# Patient Record
Sex: FEMALE | Race: BLACK OR AFRICAN AMERICAN | NOT HISPANIC OR LATINO | Employment: OTHER | URBAN - METROPOLITAN AREA
[De-identification: names, ages, dates, MRNs, and addresses within clinical notes are randomized per-mention and may not be internally consistent; named-entity substitution may affect disease eponyms.]

---

## 2024-03-31 ENCOUNTER — APPOINTMENT (OUTPATIENT)
Dept: GENERAL RADIOLOGY | Facility: HOSPITAL | Age: 74
End: 2024-03-31
Payer: MEDICARE

## 2024-03-31 ENCOUNTER — APPOINTMENT (OUTPATIENT)
Dept: CARDIOLOGY | Facility: HOSPITAL | Age: 74
End: 2024-03-31
Payer: MEDICARE

## 2024-03-31 ENCOUNTER — HOSPITAL ENCOUNTER (OUTPATIENT)
Facility: HOSPITAL | Age: 74
Setting detail: OBSERVATION
Discharge: HOME | End: 2024-04-01
Attending: EMERGENCY MEDICINE | Admitting: EMERGENCY MEDICINE
Payer: MEDICARE

## 2024-03-31 DIAGNOSIS — U07.1 COVID-19: Primary | ICD-10-CM

## 2024-03-31 DIAGNOSIS — R06.00 DYSPNEA, UNSPECIFIED TYPE: ICD-10-CM

## 2024-03-31 DIAGNOSIS — R07.9 CHEST PAIN, UNSPECIFIED TYPE: ICD-10-CM

## 2024-03-31 LAB
ALBUMIN SERPL-MCNC: 3.9 G/DL (ref 3.5–5.2)
ALBUMIN/GLOB SERPL: 1.1 G/DL
ALP SERPL-CCNC: 84 U/L (ref 39–117)
ALT SERPL W P-5'-P-CCNC: 13 U/L (ref 1–33)
ANION GAP SERPL CALCULATED.3IONS-SCNC: 10 MMOL/L (ref 5–15)
APTT PPP: 29.8 SECONDS (ref 61–76.5)
AST SERPL-CCNC: 23 U/L (ref 1–32)
BACTERIA UR QL AUTO: NORMAL /HPF
BASOPHILS # BLD AUTO: 0.02 10*3/MM3 (ref 0–0.2)
BASOPHILS NFR BLD AUTO: 0.5 % (ref 0–1.5)
BH CV LOWER VASCULAR LEFT COMMON FEMORAL AUGMENT: NORMAL
BH CV LOWER VASCULAR LEFT COMMON FEMORAL COMPETENT: NORMAL
BH CV LOWER VASCULAR LEFT COMMON FEMORAL COMPRESS: NORMAL
BH CV LOWER VASCULAR LEFT COMMON FEMORAL PHASIC: NORMAL
BH CV LOWER VASCULAR LEFT COMMON FEMORAL SPONT: NORMAL
BH CV LOWER VASCULAR LEFT DISTAL FEMORAL COMPRESS: NORMAL
BH CV LOWER VASCULAR LEFT GASTRONEMIUS COMPRESS: NORMAL
BH CV LOWER VASCULAR LEFT GREATER SAPH BK COMPRESS: NORMAL
BH CV LOWER VASCULAR LEFT LESSER SAPH COMPRESS: NORMAL
BH CV LOWER VASCULAR LEFT MID FEMORAL AUGMENT: NORMAL
BH CV LOWER VASCULAR LEFT MID FEMORAL COMPETENT: NORMAL
BH CV LOWER VASCULAR LEFT MID FEMORAL COMPRESS: NORMAL
BH CV LOWER VASCULAR LEFT MID FEMORAL PHASIC: NORMAL
BH CV LOWER VASCULAR LEFT MID FEMORAL SPONT: NORMAL
BH CV LOWER VASCULAR LEFT PERONEAL COMPRESS: NORMAL
BH CV LOWER VASCULAR LEFT POPLITEAL AUGMENT: NORMAL
BH CV LOWER VASCULAR LEFT POPLITEAL COMPETENT: NORMAL
BH CV LOWER VASCULAR LEFT POPLITEAL COMPRESS: NORMAL
BH CV LOWER VASCULAR LEFT POPLITEAL PHASIC: NORMAL
BH CV LOWER VASCULAR LEFT POPLITEAL SPONT: NORMAL
BH CV LOWER VASCULAR LEFT POSTERIOR TIBIAL COMPRESS: NORMAL
BH CV LOWER VASCULAR LEFT PROXIMAL FEMORAL COMPRESS: NORMAL
BH CV LOWER VASCULAR LEFT SAPHENOFEMORAL JUNCTION COMPRESS: NORMAL
BH CV LOWER VASCULAR RIGHT COMMON FEMORAL AUGMENT: NORMAL
BH CV LOWER VASCULAR RIGHT COMMON FEMORAL COMPETENT: NORMAL
BH CV LOWER VASCULAR RIGHT COMMON FEMORAL COMPRESS: NORMAL
BH CV LOWER VASCULAR RIGHT COMMON FEMORAL PHASIC: NORMAL
BH CV LOWER VASCULAR RIGHT COMMON FEMORAL SPONT: NORMAL
BH CV LOWER VASCULAR RIGHT DISTAL FEMORAL COMPRESS: NORMAL
BH CV LOWER VASCULAR RIGHT GASTRONEMIUS COMPRESS: NORMAL
BH CV LOWER VASCULAR RIGHT GREATER SAPH AK COMPRESS: NORMAL
BH CV LOWER VASCULAR RIGHT GREATER SAPH BK COMPRESS: NORMAL
BH CV LOWER VASCULAR RIGHT LESSER SAPH COMPRESS: NORMAL
BH CV LOWER VASCULAR RIGHT MID FEMORAL AUGMENT: NORMAL
BH CV LOWER VASCULAR RIGHT MID FEMORAL COMPETENT: NORMAL
BH CV LOWER VASCULAR RIGHT MID FEMORAL COMPRESS: NORMAL
BH CV LOWER VASCULAR RIGHT MID FEMORAL PHASIC: NORMAL
BH CV LOWER VASCULAR RIGHT MID FEMORAL SPONT: NORMAL
BH CV LOWER VASCULAR RIGHT PERONEAL COMPRESS: NORMAL
BH CV LOWER VASCULAR RIGHT POPLITEAL AUGMENT: NORMAL
BH CV LOWER VASCULAR RIGHT POPLITEAL COMPETENT: NORMAL
BH CV LOWER VASCULAR RIGHT POPLITEAL COMPRESS: NORMAL
BH CV LOWER VASCULAR RIGHT POPLITEAL PHASIC: NORMAL
BH CV LOWER VASCULAR RIGHT POPLITEAL SPONT: NORMAL
BH CV LOWER VASCULAR RIGHT POSTERIOR TIBIAL COMPRESS: NORMAL
BH CV LOWER VASCULAR RIGHT PROXIMAL FEMORAL COMPRESS: NORMAL
BH CV LOWER VASCULAR RIGHT SAPHENOFEMORAL JUNCTION COMPRESS: NORMAL
BH CV VAS PRELIMINARY FINDINGS SCRIPTING: 1
BILIRUB SERPL-MCNC: 0.5 MG/DL (ref 0–1.2)
BILIRUB UR QL STRIP: NEGATIVE
BUN SERPL-MCNC: 16 MG/DL (ref 8–23)
BUN/CREAT SERPL: 19.8 (ref 7–25)
CALCIUM SPEC-SCNC: 10 MG/DL (ref 8.6–10.5)
CHLORIDE SERPL-SCNC: 104 MMOL/L (ref 98–107)
CHOLEST SERPL-MCNC: 161 MG/DL (ref 0–200)
CLARITY UR: CLEAR
CO2 SERPL-SCNC: 29 MMOL/L (ref 22–29)
COLOR UR: YELLOW
CREAT SERPL-MCNC: 0.81 MG/DL (ref 0.57–1)
D DIMER PPP FEU-MCNC: 0.46 MG/L (FEU) (ref 0–0.72)
DEPRECATED RDW RBC AUTO: 55.7 FL (ref 37–54)
EGFRCR SERPLBLD CKD-EPI 2021: 77.2 ML/MIN/1.73
EOSINOPHIL # BLD AUTO: 0.02 10*3/MM3 (ref 0–0.4)
EOSINOPHIL NFR BLD AUTO: 0.5 % (ref 0.3–6.2)
ERYTHROCYTE [DISTWIDTH] IN BLOOD BY AUTOMATED COUNT: 12.4 % (ref 12.3–15.4)
GLOBULIN UR ELPH-MCNC: 3.6 GM/DL
GLUCOSE SERPL-MCNC: 96 MG/DL (ref 65–99)
GLUCOSE UR STRIP-MCNC: NEGATIVE MG/DL
HBA1C MFR BLD: 4.9 % (ref 4.8–5.6)
HCT VFR BLD AUTO: 36.4 % (ref 34–46.6)
HDLC SERPL-MCNC: 59 MG/DL (ref 40–60)
HGB BLD-MCNC: 11.4 G/DL (ref 12–15.9)
HGB UR QL STRIP.AUTO: ABNORMAL
HYALINE CASTS UR QL AUTO: NORMAL /LPF
IMM GRANULOCYTES # BLD AUTO: 0.02 10*3/MM3 (ref 0–0.05)
IMM GRANULOCYTES NFR BLD AUTO: 0.5 % (ref 0–0.5)
INR PPP: 1 (ref 0.93–1.1)
KETONES UR QL STRIP: NEGATIVE
LDLC SERPL CALC-MCNC: 87 MG/DL (ref 0–100)
LDLC/HDLC SERPL: 1.47 {RATIO}
LEUKOCYTE ESTERASE UR QL STRIP.AUTO: ABNORMAL
LYMPHOCYTES # BLD AUTO: 0.84 10*3/MM3 (ref 0.7–3.1)
LYMPHOCYTES NFR BLD AUTO: 21.8 % (ref 19.6–45.3)
MACROCYTES BLD QL SMEAR: NORMAL
MCH RBC QN AUTO: 38.3 PG (ref 26.6–33)
MCHC RBC AUTO-ENTMCNC: 31.3 G/DL (ref 31.5–35.7)
MCV RBC AUTO: 122.1 FL (ref 79–97)
MONOCYTES # BLD AUTO: 0.3 10*3/MM3 (ref 0.1–0.9)
MONOCYTES NFR BLD AUTO: 7.8 % (ref 5–12)
NEUTROPHILS NFR BLD AUTO: 2.65 10*3/MM3 (ref 1.7–7)
NEUTROPHILS NFR BLD AUTO: 68.9 % (ref 42.7–76)
NITRITE UR QL STRIP: NEGATIVE
NRBC BLD AUTO-RTO: 0 /100 WBC (ref 0–0.2)
NT-PROBNP SERPL-MCNC: 224.7 PG/ML (ref 0–900)
PH UR STRIP.AUTO: 7 [PH] (ref 5–8)
PLATELET # BLD AUTO: 471 10*3/MM3 (ref 140–450)
PMV BLD AUTO: 8.8 FL (ref 6–12)
POTASSIUM SERPL-SCNC: 3.9 MMOL/L (ref 3.5–5.2)
PROT SERPL-MCNC: 7.5 G/DL (ref 6–8.5)
PROT UR QL STRIP: NEGATIVE
PROTHROMBIN TIME: 10.9 SECONDS (ref 9.6–11.7)
RBC # BLD AUTO: 2.98 10*6/MM3 (ref 3.77–5.28)
RBC # UR STRIP: NORMAL /HPF
REF LAB TEST METHOD: NORMAL
SMALL PLATELETS BLD QL SMEAR: NORMAL
SODIUM SERPL-SCNC: 143 MMOL/L (ref 136–145)
SP GR UR STRIP: 1.01 (ref 1–1.03)
SQUAMOUS #/AREA URNS HPF: NORMAL /HPF
T4 FREE SERPL-MCNC: 1.19 NG/DL (ref 0.93–1.7)
TRIGL SERPL-MCNC: 77 MG/DL (ref 0–150)
TROPONIN T SERPL HS-MCNC: 18 NG/L
TROPONIN T SERPL HS-MCNC: 19 NG/L
TSH SERPL DL<=0.05 MIU/L-ACNC: 0.95 UIU/ML (ref 0.27–4.2)
UROBILINOGEN UR QL STRIP: ABNORMAL
VLDLC SERPL-MCNC: 15 MG/DL (ref 5–40)
WBC # UR STRIP: NORMAL /HPF
WBC MORPH BLD: NORMAL
WBC NRBC COR # BLD AUTO: 3.85 10*3/MM3 (ref 3.4–10.8)

## 2024-03-31 PROCEDURE — G0378 HOSPITAL OBSERVATION PER HR: HCPCS

## 2024-03-31 PROCEDURE — 83036 HEMOGLOBIN GLYCOSYLATED A1C: CPT | Performed by: NURSE PRACTITIONER

## 2024-03-31 PROCEDURE — 84484 ASSAY OF TROPONIN QUANT: CPT | Performed by: NURSE PRACTITIONER

## 2024-03-31 PROCEDURE — 85610 PROTHROMBIN TIME: CPT | Performed by: EMERGENCY MEDICINE

## 2024-03-31 PROCEDURE — 84443 ASSAY THYROID STIM HORMONE: CPT | Performed by: NURSE PRACTITIONER

## 2024-03-31 PROCEDURE — 85730 THROMBOPLASTIN TIME PARTIAL: CPT | Performed by: EMERGENCY MEDICINE

## 2024-03-31 PROCEDURE — 93970 EXTREMITY STUDY: CPT | Performed by: STUDENT IN AN ORGANIZED HEALTH CARE EDUCATION/TRAINING PROGRAM

## 2024-03-31 PROCEDURE — 84484 ASSAY OF TROPONIN QUANT: CPT | Performed by: EMERGENCY MEDICINE

## 2024-03-31 PROCEDURE — 85025 COMPLETE CBC W/AUTO DIFF WBC: CPT | Performed by: EMERGENCY MEDICINE

## 2024-03-31 PROCEDURE — 93005 ELECTROCARDIOGRAM TRACING: CPT

## 2024-03-31 PROCEDURE — 83880 ASSAY OF NATRIURETIC PEPTIDE: CPT | Performed by: NURSE PRACTITIONER

## 2024-03-31 PROCEDURE — 71045 X-RAY EXAM CHEST 1 VIEW: CPT

## 2024-03-31 PROCEDURE — 80061 LIPID PANEL: CPT | Performed by: NURSE PRACTITIONER

## 2024-03-31 PROCEDURE — 93970 EXTREMITY STUDY: CPT

## 2024-03-31 PROCEDURE — 99285 EMERGENCY DEPT VISIT HI MDM: CPT

## 2024-03-31 PROCEDURE — 80053 COMPREHEN METABOLIC PANEL: CPT | Performed by: EMERGENCY MEDICINE

## 2024-03-31 PROCEDURE — 85007 BL SMEAR W/DIFF WBC COUNT: CPT | Performed by: EMERGENCY MEDICINE

## 2024-03-31 PROCEDURE — 81001 URINALYSIS AUTO W/SCOPE: CPT | Performed by: EMERGENCY MEDICINE

## 2024-03-31 PROCEDURE — 85379 FIBRIN DEGRADATION QUANT: CPT | Performed by: EMERGENCY MEDICINE

## 2024-03-31 PROCEDURE — 84439 ASSAY OF FREE THYROXINE: CPT | Performed by: NURSE PRACTITIONER

## 2024-03-31 RX ORDER — NITROGLYCERIN 0.4 MG/1
0.4 TABLET SUBLINGUAL
Status: DISCONTINUED | OUTPATIENT
Start: 2024-03-31 | End: 2024-04-01 | Stop reason: HOSPADM

## 2024-03-31 RX ORDER — SODIUM CHLORIDE 0.9 % (FLUSH) 0.9 %
10 SYRINGE (ML) INJECTION AS NEEDED
Status: DISCONTINUED | OUTPATIENT
Start: 2024-03-31 | End: 2024-04-01 | Stop reason: HOSPADM

## 2024-03-31 RX ORDER — BENZONATATE 100 MG/1
100 CAPSULE ORAL 3 TIMES DAILY PRN
Status: DISCONTINUED | OUTPATIENT
Start: 2024-03-31 | End: 2024-04-01 | Stop reason: HOSPADM

## 2024-03-31 RX ORDER — ALBUTEROL SULFATE 90 UG/1
2 AEROSOL, METERED RESPIRATORY (INHALATION) EVERY 6 HOURS PRN
Status: DISCONTINUED | OUTPATIENT
Start: 2024-03-31 | End: 2024-04-01 | Stop reason: HOSPADM

## 2024-03-31 RX ORDER — ONDANSETRON 4 MG/1
4 TABLET, ORALLY DISINTEGRATING ORAL EVERY 6 HOURS PRN
Status: DISCONTINUED | OUTPATIENT
Start: 2024-03-31 | End: 2024-04-01 | Stop reason: HOSPADM

## 2024-03-31 RX ORDER — ASPIRIN 81 MG/1
81 TABLET ORAL 2 TIMES DAILY
COMMUNITY

## 2024-03-31 RX ORDER — SODIUM CHLORIDE 0.9 % (FLUSH) 0.9 %
10 SYRINGE (ML) INJECTION EVERY 12 HOURS SCHEDULED
Status: DISCONTINUED | OUTPATIENT
Start: 2024-03-31 | End: 2024-04-01 | Stop reason: HOSPADM

## 2024-03-31 RX ORDER — HYDROXYUREA 500 MG/1
100 CAPSULE ORAL 2 TIMES DAILY
COMMUNITY

## 2024-03-31 RX ORDER — ACETAMINOPHEN 325 MG/1
650 TABLET ORAL EVERY 4 HOURS PRN
Status: DISCONTINUED | OUTPATIENT
Start: 2024-03-31 | End: 2024-04-01 | Stop reason: HOSPADM

## 2024-03-31 RX ORDER — DEXAMETHASONE SODIUM PHOSPHATE 4 MG/ML
6 INJECTION, SOLUTION INTRA-ARTICULAR; INTRALESIONAL; INTRAMUSCULAR; INTRAVENOUS; SOFT TISSUE DAILY
Qty: 20 ML | Refills: 0 | Status: DISCONTINUED | OUTPATIENT
Start: 2024-03-31 | End: 2024-04-01 | Stop reason: HOSPADM

## 2024-03-31 RX ORDER — BISACODYL 10 MG
10 SUPPOSITORY, RECTAL RECTAL DAILY PRN
Status: DISCONTINUED | OUTPATIENT
Start: 2024-03-31 | End: 2024-04-01 | Stop reason: HOSPADM

## 2024-03-31 RX ORDER — DEXAMETHASONE 6 MG/1
6 TABLET ORAL DAILY
Qty: 10 TABLET | Refills: 0 | Status: DISCONTINUED | OUTPATIENT
Start: 2024-03-31 | End: 2024-04-01 | Stop reason: HOSPADM

## 2024-03-31 RX ORDER — GUAIFENESIN 600 MG/1
600 TABLET, EXTENDED RELEASE ORAL EVERY 12 HOURS SCHEDULED
Status: DISCONTINUED | OUTPATIENT
Start: 2024-03-31 | End: 2024-04-01 | Stop reason: HOSPADM

## 2024-03-31 RX ORDER — BISACODYL 5 MG/1
5 TABLET, DELAYED RELEASE ORAL DAILY PRN
Status: DISCONTINUED | OUTPATIENT
Start: 2024-03-31 | End: 2024-04-01 | Stop reason: HOSPADM

## 2024-03-31 RX ORDER — HYDRALAZINE HYDROCHLORIDE 20 MG/ML
10 INJECTION INTRAMUSCULAR; INTRAVENOUS EVERY 6 HOURS PRN
Status: DISCONTINUED | OUTPATIENT
Start: 2024-03-31 | End: 2024-04-01 | Stop reason: HOSPADM

## 2024-03-31 RX ORDER — AMOXICILLIN 250 MG
2 CAPSULE ORAL 2 TIMES DAILY PRN
Status: DISCONTINUED | OUTPATIENT
Start: 2024-03-31 | End: 2024-04-01 | Stop reason: HOSPADM

## 2024-03-31 RX ORDER — POLYETHYLENE GLYCOL 3350 17 G/17G
17 POWDER, FOR SOLUTION ORAL DAILY PRN
Status: DISCONTINUED | OUTPATIENT
Start: 2024-03-31 | End: 2024-04-01 | Stop reason: HOSPADM

## 2024-03-31 RX ORDER — SODIUM CHLORIDE 9 MG/ML
40 INJECTION, SOLUTION INTRAVENOUS AS NEEDED
Status: DISCONTINUED | OUTPATIENT
Start: 2024-03-31 | End: 2024-04-01 | Stop reason: HOSPADM

## 2024-03-31 RX ORDER — ONDANSETRON 2 MG/ML
4 INJECTION INTRAMUSCULAR; INTRAVENOUS EVERY 6 HOURS PRN
Status: DISCONTINUED | OUTPATIENT
Start: 2024-03-31 | End: 2024-04-01 | Stop reason: HOSPADM

## 2024-03-31 RX ADMIN — SENNOSIDES AND DOCUSATE SODIUM 2 TABLET: 8.6; 5 TABLET ORAL at 21:59

## 2024-03-31 RX ADMIN — Medication 10 ML: at 21:55

## 2024-03-31 RX ADMIN — DEXAMETHASONE 6 MG: 6 TABLET ORAL at 18:22

## 2024-03-31 NOTE — PLAN OF CARE
Goal Outcome Evaluation:  Plan of Care Reviewed With: patient, daughter        Progress: improving  Outcome Evaluation: Patient alert and oriented. Patient on room air. Patient on tele. Patient has no c/o pain at this time. Patient has no c/o SOB at this time. Patient has ordered a meal and daughter is at bedside.

## 2024-03-31 NOTE — PLAN OF CARE
Problem: Adult Inpatient Plan of Care  Goal: Plan of Care Review  3/31/2024 1735 by Kaitlin Feliciano RN  Outcome: Ongoing, Progressing  3/31/2024 1734 by Kaitlin Feliciano RN  Outcome: Ongoing, Progressing  Flowsheets (Taken 3/31/2024 1734)  Progress: improving  Plan of Care Reviewed With:   patient   daughter  Outcome Evaluation: Patient alert and oriented. Patient on room air. Patient on tele. Patient has no c/o pain at this time. Patient has no c/o SOB at this time. Patient has ordered a meal and daughter is at bedside.  Goal: Patient-Specific Goal (Individualized)  3/31/2024 1735 by Kaitlin Feliciano RN  Outcome: Ongoing, Progressing  3/31/2024 1734 by Kaitlin Feliciano RN  Outcome: Ongoing, Progressing  Goal: Absence of Hospital-Acquired Illness or Injury  3/31/2024 1735 by Kaitlin Feliciano RN  Outcome: Ongoing, Progressing  3/31/2024 1734 by Kaitlin Feliciano RN  Outcome: Ongoing, Progressing  Goal: Optimal Comfort and Wellbeing  3/31/2024 1735 by Kaitlin Feliciano RN  Outcome: Ongoing, Progressing  3/31/2024 1734 by Kaitlin Feliciano RN  Outcome: Ongoing, Progressing  Goal: Readiness for Transition of Care  3/31/2024 1735 by Kaitlin Feliciano RN  Outcome: Ongoing, Progressing  3/31/2024 1734 by Kaitlin Feliciano RN  Outcome: Ongoing, Progressing  Intervention: Mutually Develop Transition Plan  Recent Flowsheet Documentation  Taken 3/31/2024 1726 by Kaitlin Feliciano RN  Equipment Currently Used at Home: none  Transportation Anticipated: family or friend will provide  Patient/Family Anticipated Services at Transition: none  Patient/Family Anticipates Transition to: home with family   Goal Outcome Evaluation:  Plan of Care Reviewed With: patient, daughter        Progress: improving  Outcome Evaluation: Patient alert and oriented. Patient on room air. Patient on tele. Patient has no c/o pain at this time. Patient has no c/o SOB at this time. Patient has ordered a meal and daughter is at bedside.

## 2024-03-31 NOTE — ED PROVIDER NOTES
"Subjective   History of Present Illness  Chief complaint: Patient is a 73-year-old who presents to the emergency department today from the urgent care.  She was diagnosed with COVID there.  The provider that took care of the patient was concerned about \"blood clots in my lungs\" and sent her to the emergency department.  She has had some shortness of breath with a cough for approximately 1 week.  She has not had fever.  She started with sinus congestion and was using saline spray that was not helping.  She noticed a \"discomfort in my chest\".  She also notes frequent urination for months.  She gave urine sample today and states they \"found blood in my urine\".  She has a chemo pill that she takes twice daily for blood cancer with elevated white count.  She cannot remember the name of the cancer.  She takes it twice a day.  She occasionally has some discomfort in her calfs bilaterally.  She does not have chest pain.    Context:    Duration:    Timing:    Severity:    Associated Symptoms:        PCP:  LMP:      Review of Systems   Constitutional:  Negative for fever.   HENT:  Positive for congestion.    Respiratory:  Positive for cough and shortness of breath.    Cardiovascular:  Negative for chest pain and leg swelling.   Gastrointestinal:  Negative for abdominal pain.   Genitourinary: Negative.    Musculoskeletal:  Positive for myalgias.       No past medical history on file.    No Known Allergies    No past surgical history on file.    No family history on file.    Social History     Socioeconomic History    Marital status:            Objective   Physical Exam  Vitals and nursing note reviewed.   Constitutional:       Appearance: She is well-developed.   HENT:      Head: Normocephalic and atraumatic.   Cardiovascular:      Rate and Rhythm: Normal rate and regular rhythm.   Pulmonary:      Effort: Pulmonary effort is normal.      Breath sounds: Normal breath sounds.   Musculoskeletal:      Right lower leg: No " tenderness. No edema.      Left lower leg: No tenderness. No edema.   Skin:     General: Skin is warm and dry.   Neurological:      General: No focal deficit present.      Mental Status: She is alert and oriented to person, place, and time.   Psychiatric:         Mood and Affect: Mood normal.         Behavior: Behavior normal.         Procedures           ED Course      Results for orders placed or performed during the hospital encounter of 03/31/24   Comprehensive Metabolic Panel    Specimen: Blood   Result Value Ref Range    Glucose 96 65 - 99 mg/dL    BUN 16 8 - 23 mg/dL    Creatinine 0.81 0.57 - 1.00 mg/dL    Sodium 143 136 - 145 mmol/L    Potassium 3.9 3.5 - 5.2 mmol/L    Chloride 104 98 - 107 mmol/L    CO2 29.0 22.0 - 29.0 mmol/L    Calcium 10.0 8.6 - 10.5 mg/dL    Total Protein 7.5 6.0 - 8.5 g/dL    Albumin 3.9 3.5 - 5.2 g/dL    ALT (SGPT) 13 1 - 33 U/L    AST (SGOT) 23 1 - 32 U/L    Alkaline Phosphatase 84 39 - 117 U/L    Total Bilirubin 0.5 0.0 - 1.2 mg/dL    Globulin 3.6 gm/dL    A/G Ratio 1.1 g/dL    BUN/Creatinine Ratio 19.8 7.0 - 25.0    Anion Gap 10.0 5.0 - 15.0 mmol/L    eGFR 77.2 >60.0 mL/min/1.73   Protime-INR    Specimen: Blood   Result Value Ref Range    Protime 10.9 9.6 - 11.7 Seconds    INR 1.00 0.93 - 1.10   aPTT    Specimen: Blood   Result Value Ref Range    PTT 29.8 (L) 61.0 - 76.5 seconds   D-dimer, Quantitative    Specimen: Blood   Result Value Ref Range    D-Dimer, Quantitative 0.46 0.00 - 0.72 mg/L (FEU)   High Sensitivity Troponin T    Specimen: Blood   Result Value Ref Range    HS Troponin T 19 (H) <14 ng/L   CBC Auto Differential    Specimen: Blood   Result Value Ref Range    WBC 3.85 3.40 - 10.80 10*3/mm3    RBC 2.98 (L) 3.77 - 5.28 10*6/mm3    Hemoglobin 11.4 (L) 12.0 - 15.9 g/dL    Hematocrit 36.4 34.0 - 46.6 %    .1 (H) 79.0 - 97.0 fL    MCH 38.3 (H) 26.6 - 33.0 pg    MCHC 31.3 (L) 31.5 - 35.7 g/dL    RDW 12.4 12.3 - 15.4 %    RDW-SD 55.7 (H) 37.0 - 54.0 fl    MPV 8.8 6.0 -  12.0 fL    Platelets 471 (H) 140 - 450 10*3/mm3    Neutrophil % 68.9 42.7 - 76.0 %    Lymphocyte % 21.8 19.6 - 45.3 %    Monocyte % 7.8 5.0 - 12.0 %    Eosinophil % 0.5 0.3 - 6.2 %    Basophil % 0.5 0.0 - 1.5 %    Immature Grans % 0.5 0.0 - 0.5 %    Neutrophils, Absolute 2.65 1.70 - 7.00 10*3/mm3    Lymphocytes, Absolute 0.84 0.70 - 3.10 10*3/mm3    Monocytes, Absolute 0.30 0.10 - 0.90 10*3/mm3    Eosinophils, Absolute 0.02 0.00 - 0.40 10*3/mm3    Basophils, Absolute 0.02 0.00 - 0.20 10*3/mm3    Immature Grans, Absolute 0.02 0.00 - 0.05 10*3/mm3    nRBC 0.0 0.0 - 0.2 /100 WBC   Urinalysis With Culture If Indicated - Urine, Clean Catch    Specimen: Urine, Clean Catch   Result Value Ref Range    Color, UA Yellow Yellow, Straw    Appearance, UA Clear Clear    pH, UA 7.0 5.0 - 8.0    Specific Gravity, UA 1.007 1.005 - 1.030    Glucose, UA Negative Negative    Ketones, UA Negative Negative    Bilirubin, UA Negative Negative    Blood, UA Trace (A) Negative    Protein, UA Negative Negative    Leuk Esterase, UA Small (1+) (A) Negative    Nitrite, UA Negative Negative    Urobilinogen, UA 0.2 E.U./dL 0.2 - 1.0 E.U./dL   Urinalysis, Microscopic Only - Urine, Clean Catch    Specimen: Urine, Clean Catch   Result Value Ref Range    RBC, UA 0-2 None Seen, 0-2 /HPF    WBC, UA 0-2 None Seen, 0-2 /HPF    Bacteria, UA None Seen None Seen /HPF    Squamous Epithelial Cells, UA 0-2 None Seen, 0-2 /HPF    Hyaline Casts, UA None Seen None Seen /LPF    Methodology Automated Microscopy    Scan Slide    Specimen: Blood   Result Value Ref Range    Macrocytes Slight/1+ None Seen    WBC Morphology Normal Normal    Platelet Estimate Increased Normal   ECG 12 Lead Dyspnea   Result Value Ref Range    QT Interval 393 ms    QTC Interval 437 ms   Duplex Venous Lower Extremity - BILATERAL   Result Value Ref Range    Right Common Femoral Spont Y     Right Common Femoral Competent Y     Right Common Femoral Phasic Y     Right Common Femoral Compress C      Right Common Femoral Augment Y     Right Saphenofemoral Junction Compress C     Right Proximal Femoral Compress C     Right Mid Femoral Spont Y     Right Mid Femoral Competent Y     Right Mid Femoral Phasic Y     Right Mid Femoral Compress C     Right Mid Femoral Augment Y     Right Distal Femoral Compress C     Right Popliteal Spont Y     Right Popliteal Competent Y     Right Popliteal Phasic Y     Right Popliteal Compress C     Right Popliteal Augment Y     Right Posterior Tibial Compress C     Right Peroneal Compress C     Right Gastronemius Compress C     Right Greater Saph AK Compress C     Right Greater Saph BK Compress C     Right Lesser Saph Compress C     Left Common Femoral Spont Y     Left Common Femoral Competent Y     Left Common Femoral Phasic Y     Left Common Femoral Compress C     Left Common Femoral Augment Y     Left Saphenofemoral Junction Compress C     Left Proximal Femoral Compress C     Left Mid Femoral Spont Y     Left Mid Femoral Competent Y     Left Mid Femoral Phasic Y     Left Mid Femoral Compress C     Left Mid Femoral Augment Y     Left Distal Femoral Compress C     Left Popliteal Spont Y     Left Popliteal Competent Y     Left Popliteal Phasic Y     Left Popliteal Compress C     Left Popliteal Augment Y     Left Posterior Tibial Compress C     Left Peroneal Compress C     Left Gastronemius Compress C     Left Greater Saph BK Compress C     Left Lesser Saph Compress C     BH CV VAS PRELIMINARY FINDINGS SCRIPTING 1.0                                 XR Chest 1 View    Result Date: 3/31/2024  Impression: No acute process identified. Electronically Signed: Ricardo Grewal MD  3/31/2024 1:44 PM EDT  Workstation ID: [id number]               Medical Decision Making  Patient was seen evaluated for the above problem    Differential diagnosis includes but is not limited to PE, COVID-19, ACS,    Patient had initial EKG interpreted by myself sinus rhythm rate of 74.  She has had bouts of an  uncomfortable sensation in her chest.  Initial troponin is 19 and mildly elevated.  Her D-dimer is normal.  She is not tachycardic or hypoxic.  I do not think she has PE.  Her ultrasound Dopplers are negative for DVT.  However with the elevated troponin and recurrent episode of dyspnea and chest discomfort will place in the ED observation unit for cardiac consultation.  She does have COVID.  Symptomatic for approximately 1 week at this point.  Discussed with Margie in the ED observation unit who agrees to take the patient.    Amount and/or Complexity of Data Reviewed  Labs: ordered. Decision-making details documented in ED Course.     Details: Labs reviewed by myself  Radiology: ordered and independent interpretation performed. Decision-making details documented in ED Course.     Details: X-ray reviewed by myself  ECG/medicine tests: ordered and independent interpretation performed.     Details: EKG interpreted by myself sinus rhythm rate of 74    Risk  Prescription drug management.  Decision regarding hospitalization.        Final diagnoses:   None   COVID-19  Chest pain/dyspnea    ED Disposition  ED Disposition       None            No follow-up provider specified.       Medication List      No changes were made to your prescriptions during this visit.            Gareth Babcock DO  03/31/24 1530

## 2024-03-31 NOTE — ED NOTES
Pt reports taking her son to Urgent Care yesterday- he was COVID positive. Pt went to [facility] today and tested positive for COVID as well. Pt reports some SOA and slight congestion feeling. Pt reports [facility] tested urine and it came back with blood in it. Pt reports increased frequency with urination. Pt denies GI symptoms. Pt reports on chemo pill BID.

## 2024-04-01 ENCOUNTER — READMISSION MANAGEMENT (OUTPATIENT)
Dept: CALL CENTER | Facility: HOSPITAL | Age: 74
End: 2024-04-01
Payer: MEDICARE

## 2024-04-01 VITALS
HEIGHT: 67 IN | TEMPERATURE: 97.6 F | WEIGHT: 233.69 LBS | HEART RATE: 98 BPM | OXYGEN SATURATION: 98 % | SYSTOLIC BLOOD PRESSURE: 164 MMHG | BODY MASS INDEX: 36.68 KG/M2 | RESPIRATION RATE: 15 BRPM | DIASTOLIC BLOOD PRESSURE: 77 MMHG

## 2024-04-01 LAB
ANION GAP SERPL CALCULATED.3IONS-SCNC: 3 MMOL/L (ref 5–15)
ANISOCYTOSIS BLD QL: NORMAL
BASOPHILS # BLD AUTO: 0.01 10*3/MM3 (ref 0–0.2)
BASOPHILS NFR BLD AUTO: 0.3 % (ref 0–1.5)
BUN SERPL-MCNC: 18 MG/DL (ref 8–23)
BUN/CREAT SERPL: 26.1 (ref 7–25)
CALCIUM SPEC-SCNC: 9.8 MG/DL (ref 8.6–10.5)
CHLORIDE SERPL-SCNC: 104 MMOL/L (ref 98–107)
CO2 SERPL-SCNC: 30 MMOL/L (ref 22–29)
CREAT SERPL-MCNC: 0.69 MG/DL (ref 0.57–1)
DEPRECATED RDW RBC AUTO: 53 FL (ref 37–54)
EGFRCR SERPLBLD CKD-EPI 2021: 92.3 ML/MIN/1.73
EOSINOPHIL # BLD AUTO: 0 10*3/MM3 (ref 0–0.4)
EOSINOPHIL NFR BLD AUTO: 0 % (ref 0.3–6.2)
ERYTHROCYTE [DISTWIDTH] IN BLOOD BY AUTOMATED COUNT: 11.9 % (ref 12.3–15.4)
GLUCOSE SERPL-MCNC: 136 MG/DL (ref 65–99)
HCT VFR BLD AUTO: 33.3 % (ref 34–46.6)
HGB BLD-MCNC: 10.5 G/DL (ref 12–15.9)
IMM GRANULOCYTES # BLD AUTO: 0.01 10*3/MM3 (ref 0–0.05)
IMM GRANULOCYTES NFR BLD AUTO: 0.3 % (ref 0–0.5)
IRON 24H UR-MRATE: 61 MCG/DL (ref 37–145)
IRON SATN MFR SERPL: 24 % (ref 20–50)
LYMPHOCYTES # BLD AUTO: 0.37 10*3/MM3 (ref 0.7–3.1)
LYMPHOCYTES NFR BLD AUTO: 10.5 % (ref 19.6–45.3)
MACROCYTES BLD QL SMEAR: NORMAL
MCH RBC QN AUTO: 38.3 PG (ref 26.6–33)
MCHC RBC AUTO-ENTMCNC: 31.5 G/DL (ref 31.5–35.7)
MCV RBC AUTO: 121.5 FL (ref 79–97)
MONOCYTES # BLD AUTO: 0.14 10*3/MM3 (ref 0.1–0.9)
MONOCYTES NFR BLD AUTO: 4 % (ref 5–12)
NEUTROPHILS NFR BLD AUTO: 2.98 10*3/MM3 (ref 1.7–7)
NEUTROPHILS NFR BLD AUTO: 84.9 % (ref 42.7–76)
NRBC BLD AUTO-RTO: 0 /100 WBC (ref 0–0.2)
PLAT MORPH BLD: NORMAL
PLATELET # BLD AUTO: 486 10*3/MM3 (ref 140–450)
PMV BLD AUTO: 9.4 FL (ref 6–12)
POTASSIUM SERPL-SCNC: 3.9 MMOL/L (ref 3.5–5.2)
QT INTERVAL: 393 MS
QTC INTERVAL: 437 MS
RBC # BLD AUTO: 2.74 10*6/MM3 (ref 3.77–5.28)
SODIUM SERPL-SCNC: 137 MMOL/L (ref 136–145)
TIBC SERPL-MCNC: 259 MCG/DL (ref 298–536)
TRANSFERRIN SERPL-MCNC: 174 MG/DL (ref 200–360)
WBC MORPH BLD: NORMAL
WBC NRBC COR # BLD AUTO: 3.51 10*3/MM3 (ref 3.4–10.8)

## 2024-04-01 PROCEDURE — 84466 ASSAY OF TRANSFERRIN: CPT | Performed by: NURSE PRACTITIONER

## 2024-04-01 PROCEDURE — G0378 HOSPITAL OBSERVATION PER HR: HCPCS

## 2024-04-01 PROCEDURE — 83540 ASSAY OF IRON: CPT | Performed by: NURSE PRACTITIONER

## 2024-04-01 PROCEDURE — 85007 BL SMEAR W/DIFF WBC COUNT: CPT | Performed by: NURSE PRACTITIONER

## 2024-04-01 PROCEDURE — 85025 COMPLETE CBC W/AUTO DIFF WBC: CPT | Performed by: NURSE PRACTITIONER

## 2024-04-01 PROCEDURE — 80048 BASIC METABOLIC PNL TOTAL CA: CPT | Performed by: NURSE PRACTITIONER

## 2024-04-01 RX ORDER — SODIUM CHLORIDE 9 MG/ML
100 INJECTION, SOLUTION INTRAVENOUS CONTINUOUS
Status: DISCONTINUED | OUTPATIENT
Start: 2024-04-01 | End: 2024-04-01 | Stop reason: HOSPADM

## 2024-04-01 RX ORDER — DEXAMETHASONE 6 MG/1
6 TABLET ORAL DAILY
Qty: 3 TABLET | Refills: 0 | Status: SHIPPED | OUTPATIENT
Start: 2024-04-02 | End: 2024-04-05

## 2024-04-01 RX ORDER — BENZONATATE 100 MG/1
100 CAPSULE ORAL 3 TIMES DAILY PRN
Qty: 30 CAPSULE | Refills: 0 | Status: SHIPPED | OUTPATIENT
Start: 2024-04-01

## 2024-04-01 RX ORDER — GUAIFENESIN 600 MG/1
600 TABLET, EXTENDED RELEASE ORAL EVERY 12 HOURS SCHEDULED
Qty: 14 TABLET | Refills: 0 | Status: SHIPPED | OUTPATIENT
Start: 2024-04-01 | End: 2024-04-08

## 2024-04-01 RX ORDER — ALBUTEROL SULFATE 90 UG/1
2 AEROSOL, METERED RESPIRATORY (INHALATION) EVERY 6 HOURS PRN
Qty: 18 G | Refills: 0 | Status: SHIPPED | OUTPATIENT
Start: 2024-04-01

## 2024-04-01 RX ADMIN — GUAIFENESIN 600 MG: 600 TABLET, EXTENDED RELEASE ORAL at 09:34

## 2024-04-01 RX ADMIN — DEXAMETHASONE 6 MG: 6 TABLET ORAL at 09:34

## 2024-04-01 RX ADMIN — Medication 10 ML: at 09:34

## 2024-04-01 NOTE — CASE MANAGEMENT/SOCIAL WORK
Discharge Planning Assessment   Fil[place]     Patient Name: Ileana Magdaleno  MRN: [id number]  Today's Date: 4/1/2024    Admit Date: 3/31/2024    Plan: ROYCE PLAN: Routine home     Discharge Needs Assessment       Row Name 04/01/24 0856       Living Environment    People in Home child(gabby), adult    Name(s) of People in Home Lives with son.    Current Living Arrangements home    Potentially Unsafe Housing Conditions none    In the past 12 months has the electric, gas, oil, or water company threatened to shut off services in your home? No    Primary Care Provided by self    Provides Primary Care For no one    Family Caregiver if Needed spouse    Quality of Family Relationships helpful;involved;supportive    Able to Return to Prior Arrangements yes       Resource/Environmental Concerns    Resource/Environmental Concerns none    Transportation Concerns none       Transportation Needs    In the past 12 months, has lack of transportation kept you from medical appointments or from getting medications? no    In the past 12 months, has lack of transportation kept you from meetings, work, or from getting things needed for daily living? No       Food Insecurity    Within the past 12 months, you worried that your food would run out before you got the money to buy more. Never true    Within the past 12 months, the food you bought just didn't last and you didn't have money to get more. Never true       Transition Planning    Patient/Family Anticipates Transition to home with family    Patient/Family Anticipated Services at Transition none    Transportation Anticipated car, drives self;family or friend will provide       Discharge Needs Assessment    Readmission Within the Last 30 Days no previous admission in last 30 days    Equipment Currently Used at Home none    Anticipated Changes Related to Illness none    Equipment Needed After Discharge none                   Discharge Plan       Row Name 04/01/24 0857       Plan    Plan ROYCE  PLAN: Routine home    Patient/Family in Agreement with Plan yes    Plan Comments Met with patient and family at bedside, from routine home with son. Independent with ADL's no DME. PCP was Dr. Desir, but she left. Patient wants to research and find a good female MD. Uses oncologist as PCP for now. Pharmacy is [facility]. Able to afford medications and denies any issues with food or utilities. Denies any transportation issues, family will provide. Denies any HHC or SNF needs, denies any concerns about return home.                      Continued Care and Services - Admitted Since 3/31/2024    No active coordination exists for this encounter.       Expected Discharge Date and Time       Expected Discharge Date Expected Discharge Time    Apr 2, 2024            Demographic Summary       Row Name 04/01/24 0856       General Information    Admission Type observation    Arrived From emergency department    Required Notices Provided Observation Status Notice    Referral Source admission list    Reason for Consult discharge planning    Preferred Language English       Contact Information    Permission Granted to Share Info With     Contact Information Obtained for                    Functional Status       Row Name 04/01/24 0856       Functional Status    Usual Activity Tolerance excellent    Current Activity Tolerance excellent       Physical Activity    On average, how many days per week do you engage in moderate to strenuous exercise (like a brisk walk)? 0 days    On average, how many minutes do you engage in exercise at this level? 0 min    Number of minutes of exercise per week 0       Assessment of Health Literacy    How often do you have someone help you read hospital materials? Sometimes    How often do you have problems learning about your medical condition because of difficulty understanding written information? Sometimes    How often do you have a problem understanding what is told to you  about your medical condition? Sometimes    How confident are you filling out medical forms by yourself? Somewhat    Health Literacy Moderate       Functional Status, IADL    Medications independent    Meal Preparation independent    Housekeeping independent    Laundry independent    Shopping independent       Mental Status    General Appearance WDL WDL       Mental Status Summary    Recent Changes in Mental Status/Cognitive Functioning no changes                Met with patient in room wearing PPE: mask, face shield/goggles, gloves, gown.      Maintained distance greater than six feet and spent less than 15 minutes in the room.  Ellen Wright RN    Case Management  [phone]

## 2024-04-01 NOTE — PLAN OF CARE
Problem: Adult Inpatient Plan of Care  Goal: Plan of Care Review  4/1/2024 0609 by Katia Oliver RN  Outcome: Ongoing, Progressing  4/1/2024 0609 by Katia Oliver RN  Outcome: Ongoing, Progressing  Goal: Patient-Specific Goal (Individualized)  4/1/2024 0609 by Katia Oliver RN  Outcome: Ongoing, Progressing  4/1/2024 0609 by Katia Oliver RN  Outcome: Ongoing, Progressing  Goal: Absence of Hospital-Acquired Illness or Injury  4/1/2024 0609 by Katia Oliver RN  Outcome: Ongoing, Progressing  4/1/2024 0609 by Katia Oliver RN  Outcome: Ongoing, Progressing  Goal: Optimal Comfort and Wellbeing  4/1/2024 0609 by Katia Oliver RN  Outcome: Ongoing, Progressing  4/1/2024 0609 by Katia Oliver RN  Outcome: Ongoing, Progressing  Goal: Readiness for Transition of Care  4/1/2024 0609 by Katia Oliver RN  Outcome: Ongoing, Progressing  4/1/2024 0609 by Katia Oliver RN  Outcome: Ongoing, Progressing     Problem: Breathing Pattern Ineffective  Goal: Effective Breathing Pattern  Outcome: Ongoing, Progressing   Goal Outcome Evaluation:

## 2024-04-01 NOTE — DISCHARGE SUMMARY
"[facility]    Provider, No Known    CHIEF COMPLAINT:     Cough, dyspnea, chest pain     HISTORY OF PRESENT ILLNESS:    HPI    Patient is a 73-year-old who presents to the emergency department today from the urgent care. She was diagnosed with COVID there. The provider that took care of the patient was concerned about \"blood clots in my lungs\" and sent her to the emergency department. She has had some shortness of breath with a cough for approximately 1 week. She has not had fever. She started with sinus congestion and was using saline spray that was not helping. She noticed a \"discomfort in my chest\". She also notes frequent urination for months. She gave urine sample today and states they \"found blood in my urine\". She has a chemo pill that she takes twice daily for blood cancer with elevated white count. She cannot remember the name of the cancer. She takes it twice a day. She occasionally has some discomfort in her calfs bilaterally. She does not have chest pain.     History reviewed. No pertinent past medical history.  History reviewed. No pertinent surgical history.  History reviewed. No pertinent family history.  Social History     Tobacco Use    Smoking status: Never    Smokeless tobacco: Never   Vaping Use    Vaping status: Never Used   Substance Use Topics    Alcohol use: Never    Drug use: Never     Medications Prior to Admission   Medication Sig Dispense Refill Last Dose    aspirin 81 MG EC tablet Take 1 tablet by mouth 2 (Two) Times a Day.   3/30/2024    hydroxyurea (HYDREA) 500 MG capsule Take 100 mg/kg by mouth 2 (Two) Times a Day.   3/30/2024     Allergies:  Patient has no known allergies.    Immunization History   Administered Date(s) Administered    COVID-19 (MODERNA) 1st,2nd,3rd Dose Monovalent 10/26/2021, 11/23/2021           REVIEW OF SYSTEMS:    Review of Systems   Constitutional: Positive for malaise/fatigue. Negative for fever.   HENT:  Positive for congestion.    Eyes: " Negative.    Cardiovascular:  Positive for dyspnea on exertion.   Respiratory:  Positive for cough and shortness of breath.    Endocrine: Negative.    Hematologic/Lymphatic: Negative.    Skin: Negative.    Musculoskeletal:  Positive for myalgias.   Gastrointestinal: Negative.    Genitourinary: Negative.    Neurological:  Positive for weakness.   Psychiatric/Behavioral: Negative.     Allergic/Immunologic: Negative.        Vital Signs  Temp:  [97.6 °F (36.4 °C)-98.5 °F (36.9 °C)] 97.6 °F (36.4 °C)  Heart Rate:  [63-98] 98  Resp:  [13-20] 15  BP: (120-199)/(57-91) 164/77          Physical Exam:  Physical Exam  Vitals and nursing note reviewed.   Constitutional:       Appearance: Normal appearance.   HENT:      Head: Normocephalic and atraumatic.      Right Ear: External ear normal.      Left Ear: External ear normal.      Nose: Congestion present.      Mouth/Throat:      Pharynx: Oropharynx is clear.   Eyes:      Extraocular Movements: Extraocular movements intact.      Conjunctiva/sclera: Conjunctivae normal.      Pupils: Pupils are equal, round, and reactive to light.   Cardiovascular:      Rate and Rhythm: Normal rate and regular rhythm.      Pulses: Normal pulses.      Heart sounds: Normal heart sounds.   Pulmonary:      Effort: Pulmonary effort is normal.      Breath sounds: Wheezing present.   Abdominal:      General: Bowel sounds are normal.      Palpations: Abdomen is soft.   Musculoskeletal:         General: Normal range of motion.      Cervical back: Normal range of motion.   Skin:     General: Skin is warm.      Capillary Refill: Capillary refill takes less than 2 seconds.   Neurological:      Mental Status: She is alert and oriented to person, place, and time.   Psychiatric:         Mood and Affect: Mood normal.         Behavior: Behavior normal.         Thought Content: Thought content normal.         Judgment: Judgment normal.         Emotional Behavior:    WNL   Debilities:   none  Results Review:    I  reviewed the patient's new clinical results.  Lab Results (most recent)       Procedure Component Value Units Date/Time    Iron Profile [[id number]]  (Abnormal) Collected: 04/01/24 0607    Specimen: Blood Updated: 04/01/24 0733     Iron 61 mcg/dL      Iron Saturation (TSAT) 24 %      Transferrin 174 mg/dL      TIBC 259 mcg/dL     CBC & Differential [[id number]]  (Abnormal) Collected: 04/01/24 0607    Specimen: Blood Updated: 04/01/24 0658    Narrative:      The following orders were created for panel order CBC & Differential.  Procedure                               Abnormality         Status                     ---------                               -----------         ------                     CBC Auto Differential[[id number]]        Abnormal            Final result               Scan Slide[[id number]]                                       Final result                 Please view results for these tests on the individual orders.    CBC Auto Differential [[id number]]  (Abnormal) Collected: 04/01/24 0607    Specimen: Blood Updated: 04/01/24 0658     WBC 3.51 10*3/mm3      RBC 2.74 10*6/mm3      Hemoglobin 10.5 g/dL      Hematocrit 33.3 %      .5 fL      MCH 38.3 pg      MCHC 31.5 g/dL      RDW 11.9 %      RDW-SD 53.0 fl      MPV 9.4 fL      Platelets 486 10*3/mm3      Neutrophil % 84.9 %      Lymphocyte % 10.5 %      Monocyte % 4.0 %      Eosinophil % 0.0 %      Basophil % 0.3 %      Immature Grans % 0.3 %      Neutrophils, Absolute 2.98 10*3/mm3      Lymphocytes, Absolute 0.37 10*3/mm3      Monocytes, Absolute 0.14 10*3/mm3      Eosinophils, Absolute 0.00 10*3/mm3      Basophils, Absolute 0.01 10*3/mm3      Immature Grans, Absolute 0.01 10*3/mm3      nRBC 0.0 /100 WBC     Narrative:      Appended report. These results have been appended to a previously verified report.    Scan Slide [[id number]] Collected: 04/01/24 0607    Specimen: Blood Updated: 04/01/24 0658     Anisocytosis Slight/1+     Macrocytes Slight/1+      WBC Morphology Normal     Platelet Morphology Normal    Basic Metabolic Panel [[id number]]  (Abnormal) Collected: 04/01/24 0607    Specimen: Blood Updated: 04/01/24 0658     Glucose 136 mg/dL      BUN 18 mg/dL      Creatinine 0.69 mg/dL      Sodium 137 mmol/L      Potassium 3.9 mmol/L      Chloride 104 mmol/L      CO2 30.0 mmol/L      Calcium 9.8 mg/dL      BUN/Creatinine Ratio 26.1     Anion Gap 3.0 mmol/L      eGFR 92.3 mL/min/1.73     Narrative:      GFR Normal >60  Chronic Kidney Disease <60  Kidney Failure <15    The GFR formula is only valid for adults with stable renal function between ages 18 and 70.    High Sensitivity Troponin T [[id number]]  (Abnormal) Collected: 03/31/24 1753    Specimen: Blood Updated: 03/31/24 1818     HS Troponin T 18 ng/L     Narrative:      High Sensitive Troponin T Reference Range:  <14.0 ng/L- Negative Female for AMI  <22.0 ng/L- Negative Male for AMI  >=14 - Abnormal Female indicating possible myocardial injury.  >=22 - Abnormal Male indicating possible myocardial injury.   Clinicians would have to utilize clinical acumen, EKG, Troponin, and serial changes to determine if it is an Acute Myocardial Infarction or myocardial injury due to an underlying chronic condition.         BNP [[id number]]  (Normal) Collected: 03/31/24 1422    Specimen: Blood Updated: 03/31/24 1726     proBNP 224.7 pg/mL     Narrative:      This assay is used as an aid in the diagnosis of individuals suspected of having heart failure. It can be used as an aid in the diagnosis of acute decompensated heart failure (ADHF) in patients presenting with signs and symptoms of ADHF to the emergency department (ED). In addition, NT-proBNP of <300 pg/mL indicates ADHF is not likely.    Age Range Result Interpretation  NT-proBNP Concentration (pg/mL:      <50             Positive            >450                   Gray                 300-450                    Negative             <300    50-75           Positive             >900                  Gray                300-900                  Negative            <300      >75             Positive            >1800                  Gray                300-1800                  Negative            <300    TSH [[id number]]  (Normal) Collected: 03/31/24 1422    Specimen: Blood Updated: 03/31/24 1726     TSH 0.952 uIU/mL     T4, Free [[id number]]  (Normal) Collected: 03/31/24 1422    Specimen: Blood Updated: 03/31/24 1726     Free T4 1.19 ng/dL     Narrative:      Results may be falsely increased if patient taking Biotin.      Lipid Panel [[id number]] Collected: 03/31/24 1422    Specimen: Blood Updated: 03/31/24 1721     Total Cholesterol 161 mg/dL      Triglycerides 77 mg/dL      HDL Cholesterol 59 mg/dL      LDL Cholesterol  87 mg/dL      VLDL Cholesterol 15 mg/dL      LDL/HDL Ratio 1.47    Narrative:      Cholesterol Reference Ranges  (U.S. Department of Health and Human Services ATP III Classifications)    Desirable          <200 mg/dL  Borderline High    200-239 mg/dL  High Risk          >240 mg/dL      Triglyceride Reference Ranges  (U.S. Department of Health and Human Services ATP III Classifications)    Normal           <150 mg/dL  Borderline High  150-199 mg/dL  High             200-499 mg/dL  Very High        >500 mg/dL    HDL Reference Ranges  (U.S. Department of Health and Human Services ATP III Classifications)    Low     <40 mg/dl (major risk factor for CHD)  High    >60 mg/dl ('negative' risk factor for CHD)        LDL Reference Ranges  (U.S. Department of Health and Human Services ATP III Classifications)    Optimal          <100 mg/dL  Near Optimal     100-129 mg/dL  Borderline High  130-159 mg/dL  High             160-189 mg/dL  Very High        >189 mg/dL    Hemoglobin A1c [[id number]]  (Normal) Collected: 03/31/24 1422    Specimen: Blood Updated: 03/31/24 1717     Hemoglobin A1C 4.90 %     Comprehensive Metabolic Panel [[id number]] Collected: 03/31/24 1422    Specimen: Blood  Updated: 03/31/24 1457     Glucose 96 mg/dL      BUN 16 mg/dL      Creatinine 0.81 mg/dL      Sodium 143 mmol/L      Potassium 3.9 mmol/L      Chloride 104 mmol/L      CO2 29.0 mmol/L      Calcium 10.0 mg/dL      Total Protein 7.5 g/dL      Albumin 3.9 g/dL      ALT (SGPT) 13 U/L      AST (SGOT) 23 U/L      Alkaline Phosphatase 84 U/L      Total Bilirubin 0.5 mg/dL      Globulin 3.6 gm/dL      A/G Ratio 1.1 g/dL      BUN/Creatinine Ratio 19.8     Anion Gap 10.0 mmol/L      eGFR 77.2 mL/min/1.73     Narrative:      GFR Normal >60  Chronic Kidney Disease <60  Kidney Failure <15    The GFR formula is only valid for adults with stable renal function between ages 18 and 70.    High Sensitivity Troponin T [[id number]]  (Abnormal) Collected: 03/31/24 1422    Specimen: Blood Updated: 03/31/24 1457     HS Troponin T 19 ng/L     Narrative:      High Sensitive Troponin T Reference Range:  <14.0 ng/L- Negative Female for AMI  <22.0 ng/L- Negative Male for AMI  >=14 - Abnormal Female indicating possible myocardial injury.  >=22 - Abnormal Male indicating possible myocardial injury.   Clinicians would have to utilize clinical acumen, EKG, Troponin, and serial changes to determine if it is an Acute Myocardial Infarction or myocardial injury due to an underlying chronic condition.         CBC & Differential [[id number]]  (Abnormal) Collected: 03/31/24 1422    Specimen: Blood Updated: 03/31/24 1455    Narrative:      The following orders were created for panel order CBC & Differential.  Procedure                               Abnormality         Status                     ---------                               -----------         ------                     CBC Auto Differential[[id number]]        Abnormal            Final result               Scan Slide[[id number]]                                       Final result                 Please view results for these tests on the individual orders.    CBC Auto Differential [[id number]]   (Abnormal) Collected: 03/31/24 1422    Specimen: Blood Updated: 03/31/24 1455     WBC 3.85 10*3/mm3      RBC 2.98 10*6/mm3      Hemoglobin 11.4 g/dL      Hematocrit 36.4 %      .1 fL      MCH 38.3 pg      MCHC 31.3 g/dL      RDW 12.4 %      RDW-SD 55.7 fl      MPV 8.8 fL      Platelets 471 10*3/mm3      Neutrophil % 68.9 %      Lymphocyte % 21.8 %      Monocyte % 7.8 %      Eosinophil % 0.5 %      Basophil % 0.5 %      Immature Grans % 0.5 %      Neutrophils, Absolute 2.65 10*3/mm3      Lymphocytes, Absolute 0.84 10*3/mm3      Monocytes, Absolute 0.30 10*3/mm3      Eosinophils, Absolute 0.02 10*3/mm3      Basophils, Absolute 0.02 10*3/mm3      Immature Grans, Absolute 0.02 10*3/mm3      nRBC 0.0 /100 WBC     Scan Slide [[id number]] Collected: 03/31/24 1422    Specimen: Blood Updated: 03/31/24 1455     Macrocytes Slight/1+     WBC Morphology Normal     Platelet Estimate Increased    Protime-INR [[id number]]  (Normal) Collected: 03/31/24 1422    Specimen: Blood Updated: 03/31/24 1441     Protime 10.9 Seconds      INR 1.00    aPTT [[id number]]  (Abnormal) Collected: 03/31/24 1422    Specimen: Blood Updated: 03/31/24 1441     PTT 29.8 seconds     D-dimer, Quantitative [[id number]]  (Normal) Collected: 03/31/24 1422    Specimen: Blood Updated: 03/31/24 1441     D-Dimer, Quantitative 0.46 mg/L (FEU)     Narrative:      According to the assay 's published package insert, a normal (<0.50 mg/L (FEU)) D-dimer result in conjunction with a non-high clinical probability assessment, excludes deep vein thrombosis (DVT) and pulmonary embolism (PE) with high sensitivity.    D-dimer values increase with age and this can make VTE exclusion of an older population difficult. To address this, the [employer], based on best available evidence and recent guidelines, recommends that clinicians use age-adjusted D-dimer thresholds in patients greater than 50 years of age with: a) a low probability of PE who  "do not meet all Pulmonary Embolism Rule Out Criteria, or b) in those with intermediate probability of PE.   The formula for an age-adjusted D-dimer cut-off is \"age/100\".  For example, a 60 year old patient would have an age-adjusted cut-off of 0.60 mg/L (FEU) and an 80 year old 0.80 mg/L (FEU).    Urinalysis, Microscopic Only - Urine, Clean Catch [[id number]] Collected: 03/31/24 1322    Specimen: Urine, Clean Catch Updated: 03/31/24 1331     RBC, UA 0-2 /HPF      WBC, UA 0-2 /HPF      Comment: Urine culture not indicated.        Bacteria, UA None Seen /HPF      Squamous Epithelial Cells, UA 0-2 /HPF      Hyaline Casts, UA None Seen /LPF      Methodology Automated Microscopy    Urinalysis With Culture If Indicated - Urine, Clean Catch [[id number]]  (Abnormal) Collected: 03/31/24 1322    Specimen: Urine, Clean Catch Updated: 03/31/24 1328     Color, UA Yellow     Appearance, UA Clear     pH, UA 7.0     Specific Gravity, UA 1.007     Glucose, UA Negative     Ketones, UA Negative     Bilirubin, UA Negative     Blood, UA Trace     Protein, UA Negative     Leuk Esterase, UA Small (1+)     Nitrite, UA Negative     Urobilinogen, UA 0.2 E.U./dL    Narrative:      In absence of clinical symptoms, the presence of pyuria, bacteria, and/or nitrites on the urinalysis result does not correlate with infection.            Imaging Results (Most Recent)       Procedure Component Value Units Date/Time    XR Chest 1 View [[id number]] Collected: 03/31/24 1343     Updated: 03/31/24 1430    Narrative:      XR CHEST 1 VW    Date of Exam: 3/31/2024 1:25 PM EDT    Indication: cough    Comparison: None available.    Findings:  Cardiomediastinal silhouette is unremarkable.  No airspace disease, pneumothorax, nor pleural effusion. No acute osseous abnormality identified.      Impression:      Impression:  No acute process identified.      Electronically Signed: Ricardo Grewal MD    3/31/2024 1:44 PM EDT    Workstation ID: [id number]      "     reviewed    ECG/EMG Results (most recent)       Procedure Component Value Units Date/Time    ECG 12 Lead Dyspnea [[id number]] Collected: 03/31/24 1231     Updated: 04/01/24 0603     QT Interval 393 ms      QTC Interval 437 ms     Narrative:      HEART RATE= 74  bpm  RR Interval= 808  ms  LA Interval= 186  ms  P Horizontal Axis= -8  deg  P Front Axis= 73  deg  QRSD Interval= 86  ms  QT Interval= 393  ms  QTcB= 437  ms  QRS Axis= 23  deg  T Wave Axis= 50  deg  - NORMAL ECG -  Sinus rhythm  No previous ECG available for comparison  Electronically Signed By: Gareth Babcock) 01-Apr-2024 06:03:35  Date and Time of Study: 2024-03-31 12:31:19          reviewed    Results for orders placed during the hospital encounter of 03/31/24    Duplex Venous Lower Extremity - BILATERAL    Interpretation Summary    No deep or superficial vein thrombosis identified.    Right greater saphenous vein above the knee not visualized.          Microbiology Results (last 10 days)       ** No results found for the last 240 hours. **            Assessment & Plan     Chest pain    Dyspnea    COVID-19     COVID-19  -Respiratory panel positive for UC yesterday  -Isolation precautions  -Continue Mucinex, tessalon, Decadron, breathing treatments  -IV/oral analgesics and antiemetics as needed  -Troponin 19 and 18  -BNP within normal limits  -Continue aspirin  -Chest x-ray showed no acute cardiopulmonary disease  -EKG shows sinus without acute wave changes  -Venous duplex negative for blood clots    Thrombocytosis  -Platelets 486,000  -Continue Hydrea  -Continue follow-up with Dr. Puga at [facility]    I discussed the patients findings and my recommendations with patient and family.     Discharge Diagnosis:      Chest pain    Dyspnea    COVID-19      Hospital Course  Patient is a 73 y.o. female presented with dyspnea and chest pain.  Patient states she was diagnosed with COVID-19 yesterday through urgent care.  Patient states she does have anxiety at  COVID due to losing has been over illness.  Patient reports shortness of breath with cough for about a week that is nonproductive without fever.  Patient does report congestion and some shortness of breath at rest and with exertion.  Patient reports no chest pain today.  Patient unable to have stress test or other extensive cardiac workup due to being Cocet positive.  Patient was given Mucinex, Tessalon, Decadron breathing treatments with improvement in pain.  Chest x-ray showed no acute cardiopulmonary disease.  EKG showed sinus rhythm without acute changes.  Venous duplex negative for blood clots.  Platelets at 486,000 which has improved from last blood test for patient's history of thrombosed cytosis.  Patient to take medication as prescribed.  Patient to follow-up with outpatient specialists and PCP at scheduled appointments.  Test and recommendations reviewed in length with patient and family and they agree to treatment plan.  If symptoms worsen patient to call 911 or go to nearest ED.    Past Medical History:   History reviewed. No pertinent past medical history.    Past Surgical History:   History reviewed. No pertinent surgical history.    Social History:   Social History     Socioeconomic History    Marital status:    Tobacco Use    Smoking status: Never    Smokeless tobacco: Never   Vaping Use    Vaping status: Never Used   Substance and Sexual Activity    Alcohol use: Never    Drug use: Never    Sexual activity: Defer       Procedures Performed         Consults:   Consults       No orders found for last 30 day(s).            Condition on Discharge:     Stable    Discharge Disposition  Home or Self Care    Discharge Medications     Discharge Medications        New Medications        Instructions Start Date   albuterol sulfate  (90 Base) MCG/ACT inhaler  Commonly known as: PROVENTIL HFA;VENTOLIN HFA;PROAIR HFA   2 puffs, Inhalation, Every 6 Hours PRN      benzonatate 100 MG capsule  Commonly  known as: TESSALON   100 mg, Oral, 3 Times Daily PRN      dexAMETHasone 6 MG tablet  Commonly known as: DECADRON   6 mg, Oral, Daily   Start Date: April 2, 2024     guaiFENesin 600 MG 12 hr tablet  Commonly known as: MUCINEX   600 mg, Oral, Every 12 Hours Scheduled             Continue These Medications        Instructions Start Date   aspirin 81 MG EC tablet   81 mg, Oral, 2 Times Daily      hydroxyurea 500 MG capsule  Commonly known as: HYDREA   100 mg/kg, Oral, 2 Times Daily               Discharge Diet:   Diet Instructions       Diet: Cardiac Diets; Low Sodium (2g); Thin (IDDSI 0)      Discharge Diet: Cardiac Diets    Cardiac Diet: Low Sodium (2g)    Fluid Consistency: Thin (IDDSI 0)            Activity at Discharge:   Activity Instructions       Activity as Tolerated      Measure Blood Pressure              Follow-up Appointments  No future appointments.  Additional Instructions for the Follow-ups that You Need to Schedule       Discharge Follow-up with PCP   As directed       Currently Documented PCP:    Provider, No Known    PCP Phone Number:    None     Follow Up Details: 7-10 days                Test Results Pending at Discharge       Risk for Readmission (LACE) Score: 1 (4/1/2024  6:00 AM)          JIA Dean  04/01/24  10:29 EDT    I spent 35 minutes caring for Min-Imah on this date of service. This time includes time spent by me in the following activities: reviewing tests, obtaining and/or reviewing a separately obtained history, performing a medically appropriate examination and/or evaluation, counseling and educating the patient/family/caregiver, ordering medications, tests, or procedures, referring and communicating with other health care professionals, documenting information in the medical record, independently interpreting results and communicating that information with the patient/family/caregiver, and care coordination.

## 2024-04-02 ENCOUNTER — TRANSITIONAL CARE MANAGEMENT TELEPHONE ENCOUNTER (OUTPATIENT)
Dept: CALL CENTER | Facility: HOSPITAL | Age: 74
End: 2024-04-02
Payer: MEDICARE

## 2024-04-02 NOTE — CASE MANAGEMENT/SOCIAL WORK
Case Management Discharge Note      Final Note: Routine home         Selected Continued Care - Discharged on 4/1/2024 Admission date: 3/31/2024 - Discharge disposition: Home or Self Care     Transportation Services  Private: Car    Final Discharge Disposition Code: 01 - home or self-care

## 2024-04-02 NOTE — OUTREACH NOTE
Call Center TCM Note      Flowsheet Row Responses   [facility] patient discharged from? [facility]   Does the patient have one of the following disease processes/diagnoses(primary or secondary)? Other   TCM attempt successful? Yes   Call start time 0951   Call end time 1008   Discharge diagnosis chest pain   Meds reviewed with patient/caregiver? Yes   Is the patient having any side effects they believe may be caused by any medication additions or changes? No   Does the patient have all medications ordered at discharge? Yes   Is the patient taking all medications as directed (includes completed medication regime)? Yes   Comments NEW PT appt 4/22/24 115 pm. Please note appt does not meet tcm guidelines.   Does the patient have an appointment with their PCP within 7-14 days of discharge? Yes   Has home health visited the patient within 72 hours of discharge? N/A   Psychosocial issues? No   Did the patient receive a copy of their discharge instructions? Yes   Nursing interventions Reviewed instructions with patient   What is the patient's perception of their health status since discharge? Improving   Is the patient/caregiver able to teach back signs and symptoms related to disease process for when to call PCP? Yes   Is the patient/caregiver able to teach back signs and symptoms related to disease process for when to call 911? Yes   Is the patient/caregiver able to teach back the hierarchy of who to call/visit for symptoms/problems? PCP, Specialist, Home health nurse, Urgent Care, ED, 911 Yes   TCM call completed? Yes   Wrap up additional comments Pt is improving. Reviewed all meds.   Call end time 1008            Merlene Woodward RN    4/2/2024, 10:09 EDT

## 2024-04-02 NOTE — OUTREACH NOTE
Prep Survey      Flowsheet Row Responses   [facility] patient discharged from? [facility]   Is LACE score < 7 ? Yes   Eligibility [facility]   Date of Admission 03/31/24   Date of Discharge 04/01/24   Discharge Disposition Home or Self Care   Discharge diagnosis chest pain   Does the patient have one of the following disease processes/diagnoses(primary or secondary)? Other   Does the patient have Home health ordered? No   Is there a DME ordered? No   Prep survey completed? Yes            Keyona LEON - Registered Nurse

## 2024-06-11 NOTE — PROGRESS NOTES
Chief Complaint  Chief Complaint   Patient presents with    Establish Care    Urinary Frequency    Hypertension        Subjective          Min-Jordan Magdaleno is here today to establish care. The following problems were discussed:     Family history: Her twin- cancer. Mother- diabetes. Father- MI, alcoholism.     Social history:Denies smoking, drinking, or illegal drug use.     Thrombocytosis- she sees Hematology. Compliant on medication for this.     Elevated blood pressure- Denies CP, SOA, dizziness, lightheadedness, or HA.     Obesity- She exercises some. She tries to eat a healthy diet.     Anxiety with depression- she was diagnosed is 2021 after her mother, , and twin sister passed away. She used to take anxiety as needed. Denies SI or HI. She does not see a counseling.     Urinary frequency- She reports she gets up multiple times a night. She is worried she could have a UTI.       She is from [facility], [place]   Previous PCP was Noreen Desir   Marital status-    Children- Yes- 6 children   Works as Unemployed   Exercise- irregularly  Diet- Eating well-balanced meals         Review of Systems   Constitutional:  Negative for chills and fever.   HENT:  Negative for congestion.    Respiratory:  Negative for shortness of breath.    Cardiovascular:  Negative for chest pain (cmp).   Gastrointestinal:  Negative for abdominal pain, nausea and vomiting.   Genitourinary:  Positive for frequency. Negative for difficulty urinating.   Musculoskeletal:  Positive for myalgias.   Skin: Negative.    Neurological:  Positive for dizziness. Negative for light-headedness and headache.   Psychiatric/Behavioral:  Positive for depressed mood. Negative for self-injury and suicidal ideas. The patient is nervous/anxious.         Objective   Vital Signs:   Vitals:    06/14/24 0939   BP: 171/98   Pulse:    Temp:    SpO2:       Estimated body mass index is 38.21 kg/m² as calculated from the following:    Height as of this  "encounter: 170.2 cm (67.01\").    Weight as of this encounter: 111 kg (244 lb).    Class 2 Severe Obesity (BMI >=35 and <=39.9). Obesity-related health conditions include the following: hypertension. Obesity is improving with lifestyle modifications. BMI is is above average; BMI management plan is completed. We discussed portion control and increasing exercise.            Patient screened positive for depression based on a PHQ-9 score of 1 on 6/14/2024. Follow-up recommendations include: PCP managing depression.        Physical Exam  Vitals reviewed.   Constitutional:       Appearance: Normal appearance. She is obese.   HENT:      Head: Normocephalic and atraumatic.      Nose: Nose normal.      Mouth/Throat:      Mouth: Mucous membranes are moist.      Pharynx: Oropharynx is clear.   Eyes:      Extraocular Movements: Extraocular movements intact.      Conjunctiva/sclera: Conjunctivae normal.      Pupils: Pupils are equal, round, and reactive to light.      Comments: Wears glasses    Cardiovascular:      Rate and Rhythm: Normal rate and regular rhythm.      Pulses: Normal pulses.      Heart sounds: Normal heart sounds.      Comments: S1, S2 audible  Pulmonary:      Effort: Pulmonary effort is normal.      Breath sounds: Normal breath sounds.      Comments: On room air   Abdominal:      General: Abdomen is flat. Bowel sounds are normal.      Palpations: Abdomen is soft.   Musculoskeletal:         General: Normal range of motion.      Cervical back: Normal range of motion.   Skin:     General: Skin is warm and dry.   Neurological:      General: No focal deficit present.      Mental Status: She is alert and oriented to person, place, and time. Mental status is at baseline.   Psychiatric:         Mood and Affect: Mood normal.         Behavior: Behavior normal.         Thought Content: Thought content normal.         Judgment: Judgment normal.                Physical Exam   Result Review :             Procedures     "   Assessment and Plan     Diagnoses and all orders for this visit:    1. Obesity (BMI 30-39.9) (Primary)  Assessment & Plan:  Patient's (Body mass index is 38.21 kg/m².)     Obesity- She exercises some. She tries to eat a healthy diet.     Encourage increased exercise, eats healthy diet.       2. Thrombocytosis  Assessment & Plan:  Thrombocytosis- she sees Hematology. Compliant on medication for this.     On Agrylin and aspirin      3. Encounter to establish care  Assessment & Plan:  She is from [facility], [place]   Previous PCP was Noreen Desir   Marital status-    Children- Yes- 6 children   Works as Unemployed   Exercise- irregularly  Diet- Eating well-balanced meals    Family history: Her twin- cancer. Mother- diabetes. Father- MI, alcoholism.     Social history:Denies smoking, drinking, or illegal drug use.       4. Primary hypertension  Assessment & Plan:  BP: 194/84    Elevated blood pressure- Denies CP, SOA, dizziness, lightheadedness, or HA.     Prescribed lisinopril    Keep BP log- Check BP 1-2 hours after taking medication   Bring BP monitor to next appointment   Encourage low sodium diet   Check BP if symptomatic- Chest pain, shortness of breath, dizziness, lightheadedness, heart palpitations, or headache        5. Myeloproliferative neoplasm  Assessment & Plan:  Thrombocytosis- she sees Hematology. Compliant on medication for this.     On Agrylin and aspirin      6. Family history of diabetes mellitus  -     Hemoglobin A1c  -     Comprehensive metabolic panel    7. Anxiety with depression  Assessment & Plan:  Anxiety with depression- she was diagnosed is 2021 after her mother, , and twin sister passed away. She used to take anxiety as needed. Denies SI or HI. She does not see a counseling.       8. Lack of access to transportation  -     Ambulatory Referral to Social Care Services (Amb Case Mgmt)    9. Colon cancer screening  -     Cologuard - Stool, Per Rectum; Future    10. Urinary  frequency  Assessment & Plan:  Urinary frequency- She reports she gets up multiple times a night. She is worried she could have a UTI.     Check UA, hbg A1C, and CMP    Could be OAB    Orders:  -     POCT urinalysis dipstick, automated    Other orders  -     lisinopril (PRINIVIL,ZESTRIL) 20 MG tablet; Take 1 tablet by mouth Daily.  Dispense: 30 tablet; Refill: 0              Follow Up   Return in about 2 weeks (around 6/28/2024) for HTN, Recheck.   Patient was given instructions and counseling regarding her condition or for health maintenance advice. Please see specific information pulled into the AVS if appropriate.

## 2024-06-14 ENCOUNTER — OFFICE VISIT (OUTPATIENT)
Dept: FAMILY MEDICINE CLINIC | Facility: CLINIC | Age: 74
End: 2024-06-14
Payer: MEDICARE

## 2024-06-14 ENCOUNTER — TELEPHONE (OUTPATIENT)
Dept: FAMILY MEDICINE CLINIC | Facility: CLINIC | Age: 74
End: 2024-06-14

## 2024-06-14 VITALS
TEMPERATURE: 98.6 F | BODY MASS INDEX: 38.3 KG/M2 | DIASTOLIC BLOOD PRESSURE: 98 MMHG | HEART RATE: 86 BPM | WEIGHT: 244 LBS | OXYGEN SATURATION: 95 % | SYSTOLIC BLOOD PRESSURE: 171 MMHG | HEIGHT: 67 IN

## 2024-06-14 DIAGNOSIS — E55.9 VITAMIN D DEFICIENCY: ICD-10-CM

## 2024-06-14 DIAGNOSIS — Z83.3 FAMILY HISTORY OF DIABETES MELLITUS: ICD-10-CM

## 2024-06-14 DIAGNOSIS — E66.9 OBESITY (BMI 30-39.9): Primary | ICD-10-CM

## 2024-06-14 DIAGNOSIS — D47.1 MYELOPROLIFERATIVE NEOPLASM: ICD-10-CM

## 2024-06-14 DIAGNOSIS — I10 PRIMARY HYPERTENSION: ICD-10-CM

## 2024-06-14 DIAGNOSIS — Z86.39 HISTORY OF VITAMIN D DEFICIENCY: ICD-10-CM

## 2024-06-14 DIAGNOSIS — Z59.82 LACK OF ACCESS TO TRANSPORTATION: ICD-10-CM

## 2024-06-14 DIAGNOSIS — F41.8 ANXIETY WITH DEPRESSION: ICD-10-CM

## 2024-06-14 DIAGNOSIS — Z12.11 COLON CANCER SCREENING: ICD-10-CM

## 2024-06-14 DIAGNOSIS — Z76.89 ENCOUNTER TO ESTABLISH CARE: ICD-10-CM

## 2024-06-14 DIAGNOSIS — D75.839 THROMBOCYTOSIS: ICD-10-CM

## 2024-06-14 DIAGNOSIS — R35.0 URINARY FREQUENCY: ICD-10-CM

## 2024-06-14 PROBLEM — R06.00 DYSPNEA: Status: RESOLVED | Noted: 2024-03-31 | Resolved: 2024-06-14

## 2024-06-14 PROBLEM — R07.9 CHEST PAIN: Status: RESOLVED | Noted: 2024-03-31 | Resolved: 2024-06-14

## 2024-06-14 LAB
BILIRUB BLD-MCNC: NEGATIVE MG/DL
CLARITY, POC: ABNORMAL
COLOR UR: YELLOW
EXPIRATION DATE: ABNORMAL
GLUCOSE UR STRIP-MCNC: NEGATIVE MG/DL
KETONES UR QL: NEGATIVE
LEUKOCYTE EST, POC: ABNORMAL
Lab: ABNORMAL
NITRITE UR-MCNC: NEGATIVE MG/ML
PH UR: 6 [PH] (ref 5–8)
PROT UR STRIP-MCNC: ABNORMAL MG/DL
RBC # UR STRIP: ABNORMAL /UL
SP GR UR: 1.02 (ref 1–1.03)
UROBILINOGEN UR QL: NORMAL

## 2024-06-14 PROCEDURE — 80053 COMPREHEN METABOLIC PANEL: CPT | Performed by: NURSE PRACTITIONER

## 2024-06-14 PROCEDURE — 83036 HEMOGLOBIN GLYCOSYLATED A1C: CPT | Performed by: NURSE PRACTITIONER

## 2024-06-14 PROCEDURE — 1160F RVW MEDS BY RX/DR IN RCRD: CPT | Performed by: NURSE PRACTITIONER

## 2024-06-14 PROCEDURE — 36415 COLL VENOUS BLD VENIPUNCTURE: CPT | Performed by: NURSE PRACTITIONER

## 2024-06-14 PROCEDURE — 3077F SYST BP >= 140 MM HG: CPT | Performed by: NURSE PRACTITIONER

## 2024-06-14 PROCEDURE — 81003 URINALYSIS AUTO W/O SCOPE: CPT | Performed by: NURSE PRACTITIONER

## 2024-06-14 PROCEDURE — 87086 URINE CULTURE/COLONY COUNT: CPT | Performed by: NURSE PRACTITIONER

## 2024-06-14 PROCEDURE — 1126F AMNT PAIN NOTED NONE PRSNT: CPT | Performed by: NURSE PRACTITIONER

## 2024-06-14 PROCEDURE — 1159F MED LIST DOCD IN RCRD: CPT | Performed by: NURSE PRACTITIONER

## 2024-06-14 PROCEDURE — 3079F DIAST BP 80-89 MM HG: CPT | Performed by: NURSE PRACTITIONER

## 2024-06-14 PROCEDURE — 99204 OFFICE O/P NEW MOD 45 MIN: CPT | Performed by: NURSE PRACTITIONER

## 2024-06-14 RX ORDER — LISINOPRIL 20 MG/1
20 TABLET ORAL DAILY
Qty: 30 TABLET | Refills: 0 | Status: SHIPPED | OUTPATIENT
Start: 2024-06-14

## 2024-06-14 RX ORDER — ANAGRELIDE 0.5 MG/1
0.5 CAPSULE ORAL 3 TIMES DAILY
COMMUNITY

## 2024-06-14 RX ORDER — CEFDINIR 300 MG/1
300 CAPSULE ORAL 2 TIMES DAILY
Qty: 14 CAPSULE | Refills: 0 | Status: SHIPPED | OUTPATIENT
Start: 2024-06-14

## 2024-06-14 SDOH — ECONOMIC STABILITY - TRANSPORTATION SECURITY: TRANSPORTATION INSECURITY: Z59.82

## 2024-06-14 NOTE — ASSESSMENT & PLAN NOTE
Patient's (Body mass index is 38.21 kg/m².)     Obesity- She exercises some. She tries to eat a healthy diet.     Encourage increased exercise, eats healthy diet.

## 2024-06-14 NOTE — ASSESSMENT & PLAN NOTE
Urinary frequency- She reports she gets up multiple times a night. She is worried she could have a UTI.     Check hbg A1C and CMP    UA showed large blood and trace leukocytes     Could be OAB

## 2024-06-14 NOTE — PATIENT INSTRUCTIONS
Prescribed lisinopril    Keep BP log- Check BP 1-2 hours after taking medication   Bring BP monitor to next appointment   Encourage low sodium diet   Check BP if symptomatic- Chest pain, shortness of breath, dizziness, lightheadedness, heart palpitations, or headache      Check labs

## 2024-06-14 NOTE — ASSESSMENT & PLAN NOTE
BP: 194/84    Elevated blood pressure- Denies CP, SOA, dizziness, lightheadedness, or HA.     Prescribed lisinopril    Keep BP log- Check BP 1-2 hours after taking medication   Bring BP monitor to next appointment   Encourage low sodium diet   Check BP if symptomatic- Chest pain, shortness of breath, dizziness, lightheadedness, heart palpitations, or headache

## 2024-06-14 NOTE — TELEPHONE ENCOUNTER
Caller: Ileana Magdaleno    Relationship to patient: Self    Best call back number: [phone]    PATIENT IS RETURNING PHONE CALL TO OFFICE. PATIENT WAS SEEN TODAY FOR NEW PATIENT AND DIDN'T KNOW IF IT WAS REGARDING LABS.

## 2024-06-14 NOTE — PROGRESS NOTES
Venipuncture performed in right arm with good hemostasis. Patient tolerated well. Cuca GRANT MA

## 2024-06-14 NOTE — ASSESSMENT & PLAN NOTE
She is from [facility], [place]   Previous PCP was Noreen Desir   Marital status-    Children- Yes- 6 children   Works as Unemployed   Exercise- irregularly  Diet- Eating well-balanced meals    Family history: Her twin- cancer. Mother- diabetes. Father- MI, alcoholism.     Social history:Denies smoking, drinking, or illegal drug use.

## 2024-06-14 NOTE — ASSESSMENT & PLAN NOTE
Anxiety with depression- she was diagnosed is 2021 after her mother, , and twin sister passed away. She used to take anxiety as needed. Denies SI or HI. She does not see a counseling.

## 2024-06-15 LAB
ALBUMIN SERPL-MCNC: 3.8 G/DL (ref 3.5–5.2)
ALBUMIN/GLOB SERPL: 1.1 G/DL
ALP SERPL-CCNC: 98 U/L (ref 39–117)
ALT SERPL W P-5'-P-CCNC: 13 U/L (ref 1–33)
ANION GAP SERPL CALCULATED.3IONS-SCNC: 10 MMOL/L (ref 5–15)
AST SERPL-CCNC: 18 U/L (ref 1–32)
BACTERIA SPEC AEROBE CULT: NO GROWTH
BILIRUB SERPL-MCNC: 0.4 MG/DL (ref 0–1.2)
BUN SERPL-MCNC: 23 MG/DL (ref 8–23)
BUN/CREAT SERPL: 27.4 (ref 7–25)
CALCIUM SPEC-SCNC: 9.8 MG/DL (ref 8.6–10.5)
CHLORIDE SERPL-SCNC: 108 MMOL/L (ref 98–107)
CO2 SERPL-SCNC: 26 MMOL/L (ref 22–29)
CREAT SERPL-MCNC: 0.84 MG/DL (ref 0.57–1)
EGFRCR SERPLBLD CKD-EPI 2021: 73.9 ML/MIN/1.73
GLOBULIN UR ELPH-MCNC: 3.6 GM/DL
GLUCOSE SERPL-MCNC: 100 MG/DL (ref 65–99)
HBA1C MFR BLD: 5.8 % (ref 4.8–5.6)
POTASSIUM SERPL-SCNC: 4.3 MMOL/L (ref 3.5–5.2)
PROT SERPL-MCNC: 7.4 G/DL (ref 6–8.5)
SODIUM SERPL-SCNC: 144 MMOL/L (ref 136–145)

## 2024-06-17 ENCOUNTER — REFERRAL TRIAGE (OUTPATIENT)
Age: 74
End: 2024-06-17
Payer: MEDICARE

## 2024-06-17 PROBLEM — R73.03 PRE-DIABETES: Status: ACTIVE | Noted: 2024-06-17

## 2024-06-17 NOTE — TELEPHONE ENCOUNTER
Faby Keene, APRN  6/17/2024  8:00 AM EDT       Please call patient and let her know that her hemoglobin A1c is 5.8 indicating that she is prediabetic.  I encourage her to watch her carb and sugar intake.  This is something we will continually monitor.

## 2024-06-17 NOTE — PROGRESS NOTES
Please call patient and let her know that her hemoglobin A1c is 5.8 indicating that she is prediabetic.  I encourage her to watch her carb and sugar intake.  This is something we will continually monitor.

## 2024-06-17 NOTE — TELEPHONE ENCOUNTER
Pt informed. She is having severe leg pain in joints. Not taking tylenol/advil. Used to be on a daily PRN med from previous dr, but can't remember what it was. Wants to talk about this with you at her next appt.

## 2024-06-19 ENCOUNTER — PATIENT OUTREACH (OUTPATIENT)
Age: 74
End: 2024-06-19
Payer: MEDICARE

## 2024-06-19 NOTE — OUTREACH NOTE
Social Work Assessment  Questions/Answers      Flowsheet Row Most Recent Value   Referral Source outpatient staff, outpatient clinic, physician   Reason for Consult transportation   Preferred Language English   Advance Care Planning Reviewed no concerns identified   People in Home alone   Current Living Arrangements home   Potentially Unsafe Housing Conditions none   In the past 12 months has the electric, gas, oil, or water company threatened to shut off services in your home? No   Primary Care Provided by self   Provides Primary Care For no one   Family Caregiver if Needed child(gabby), adult   Quality of Family Relationships helpful, involved   Able to Return to Prior Arrangements yes   Employment Status retired   Source of Income social security   Usual Activity Tolerance moderate   Current Activity Tolerance moderate   Medications independent   Meal Preparation independent   Housekeeping independent   Laundry independent   Shopping assistive person          SDOH updated and reviewed with the patient during this program:  Financial Resource Strain: Low Risk  (6/19/2024)    Overall Financial Resource Strain (CARDIA)     Difficulty of Paying Living Expenses: Not very hard      --     Food Insecurity: No Food Insecurity (4/1/2024)    Hunger Vital Sign     Worried About Running Out of Food in the Last Year: Never true     Ran Out of Food in the Last Year: Never true      --     Housing Stability: Not At Risk (6/19/2024)    Housing Stability     Current Living Arrangements: home     Potentially Unsafe Housing Conditions: none      --     Transportation Needs: No Transportation Needs (4/1/2024)    PRAPARE - Transportation     Lack of Transportation (Medical): No     Lack of Transportation (Non-Medical): No      Patient Outreach    SW received referral via PCP re: transportation concerns. SW called and spoke to patient. Patient lives alone, but has good social support from adult children. Denies any financial concerns or  food insecurity. SW provided information on [employer] Transportation to transport to medical appointments. Patient thanked this SW for assistance and reports no additional concerns. SW to discharge as resources have been provided.     Continuing Care   [facility]   [facility]    No address on file    Phone: [phone]    Resource for: Transportation Needs     Hermila ALMODOVAR -   Ambulatory Case Management    6/19/2024, 10:21 EDT

## 2024-07-01 NOTE — PROGRESS NOTES
"Chief Complaint  Chief Complaint   Patient presents with    Hypertension        Subjective          Min-Jordan Magdaleno is a 73-year-old female who reports to the office today for follow-up on hypertension.    Hypertension- Denies CP, SOA, dizziness, lightheadedness, or HA. Compliant on medication. She checked her BP at home, but forgot to keep checking it.     Congestion- She reports her ducts in her house have been dirty. She has been using the saline spray and it helps some.                Review of Systems   Constitutional:  Negative for chills and fever.   HENT:  Negative for congestion.    Respiratory:  Negative for shortness of breath.    Cardiovascular:  Negative for chest pain.   Gastrointestinal:  Negative for abdominal pain, nausea and vomiting.   Genitourinary:  Negative for difficulty urinating.   Musculoskeletal:  Negative for myalgias.        Bilateral knee pain    Skin: Negative.    Neurological:  Negative for dizziness, light-headedness and headache.        Objective   Vital Signs:   Vitals:    07/02/24 0857   BP: 160/75   Pulse:    Temp:    SpO2:       Estimated body mass index is 37.58 kg/m² as calculated from the following:    Height as of this encounter: 170.2 cm (67.01\").    Weight as of this encounter: 109 kg (240 lb).                          Physical Exam  Vitals reviewed.   Constitutional:       Appearance: Normal appearance. She is obese.   HENT:      Head: Normocephalic and atraumatic.      Nose: Nose normal.      Mouth/Throat:      Mouth: Mucous membranes are moist.      Pharynx: Oropharynx is clear.   Eyes:      Extraocular Movements: Extraocular movements intact.      Conjunctiva/sclera: Conjunctivae normal.      Comments: Wears glasses    Cardiovascular:      Rate and Rhythm: Normal rate and regular rhythm.      Pulses: Normal pulses.      Heart sounds: Normal heart sounds.      Comments: S1, S2 audible  Pulmonary:      Effort: Pulmonary effort is normal.      Breath sounds: Normal breath " sounds.      Comments: On room air   Abdominal:      General: Abdomen is flat.   Musculoskeletal:         General: Normal range of motion.      Cervical back: Normal range of motion.      Comments: Uses cane   Skin:     General: Skin is warm and dry.   Neurological:      General: No focal deficit present.      Mental Status: She is alert and oriented to person, place, and time. Mental status is at baseline.   Psychiatric:         Mood and Affect: Mood normal.         Behavior: Behavior normal.         Thought Content: Thought content normal.         Judgment: Judgment normal.                Physical Exam   Result Review :             Procedures       Assessment and Plan     Diagnoses and all orders for this visit:    1. Primary hypertension (Primary)  Assessment & Plan:  BP: 150/84    On lisinopril   Increased lisinopril to 40mg       Hypertension- Denies CP, SOA, dizziness, lightheadedness, or HA. Compliant on medication. She checked her BP at home, but forgot to keep checking it.     On lisinopril   Increased lisinopril to 40mg     Keep BP log- Check BP 1-2 hours after taking medication   Bring BP monitor to next appointment   Encourage low sodium diet   Check BP if symptomatic- Chest pain, shortness of breath, dizziness, lightheadedness, heart palpitations, or headache        2. Allergy, initial encounter  Assessment & Plan:  Congestion- She reports her ducts in her house have been dirty. She has been using the saline spray and it helps some.     Prescribed zyrtec  Continue saline spray       Other orders  -     lisinopril (PRINIVIL,ZESTRIL) 40 MG tablet; Take 1 tablet by mouth Daily.  Dispense: 30 tablet; Refill: 0  -     cetirizine (zyrTEC) 10 MG tablet; Take 1 tablet by mouth Daily.  Dispense: 90 tablet; Refill: 3              Follow Up   Return in about 2 weeks (around 7/16/2024) for HTN, Recheck.   Patient was given instructions and counseling regarding her condition or for health maintenance advice. Please  see specific information pulled into the AVS if appropriate.

## 2024-07-02 ENCOUNTER — OFFICE VISIT (OUTPATIENT)
Dept: FAMILY MEDICINE CLINIC | Facility: CLINIC | Age: 74
End: 2024-07-02
Payer: MEDICARE

## 2024-07-02 VITALS
BODY MASS INDEX: 37.67 KG/M2 | DIASTOLIC BLOOD PRESSURE: 75 MMHG | HEART RATE: 78 BPM | TEMPERATURE: 98.7 F | HEIGHT: 67 IN | SYSTOLIC BLOOD PRESSURE: 160 MMHG | OXYGEN SATURATION: 97 % | WEIGHT: 240 LBS

## 2024-07-02 DIAGNOSIS — I10 PRIMARY HYPERTENSION: Primary | ICD-10-CM

## 2024-07-02 DIAGNOSIS — T78.40XA ALLERGY, INITIAL ENCOUNTER: ICD-10-CM

## 2024-07-02 PROCEDURE — 3078F DIAST BP <80 MM HG: CPT | Performed by: NURSE PRACTITIONER

## 2024-07-02 PROCEDURE — 1159F MED LIST DOCD IN RCRD: CPT | Performed by: NURSE PRACTITIONER

## 2024-07-02 PROCEDURE — 3077F SYST BP >= 140 MM HG: CPT | Performed by: NURSE PRACTITIONER

## 2024-07-02 PROCEDURE — 99214 OFFICE O/P EST MOD 30 MIN: CPT | Performed by: NURSE PRACTITIONER

## 2024-07-02 PROCEDURE — G2211 COMPLEX E/M VISIT ADD ON: HCPCS | Performed by: NURSE PRACTITIONER

## 2024-07-02 PROCEDURE — 1160F RVW MEDS BY RX/DR IN RCRD: CPT | Performed by: NURSE PRACTITIONER

## 2024-07-02 RX ORDER — LISINOPRIL 40 MG/1
40 TABLET ORAL DAILY
Qty: 30 TABLET | Refills: 0 | Status: SHIPPED | OUTPATIENT
Start: 2024-07-02

## 2024-07-02 RX ORDER — CETIRIZINE HYDROCHLORIDE 10 MG/1
10 TABLET ORAL DAILY
Qty: 90 TABLET | Refills: 3 | Status: SHIPPED | OUTPATIENT
Start: 2024-07-02

## 2024-07-02 RX ORDER — ANAGRELIDE 1 MG/1
1 CAPSULE ORAL 2 TIMES DAILY
COMMUNITY
Start: 2024-07-01

## 2024-07-02 NOTE — ASSESSMENT & PLAN NOTE
Congestion- She reports her ducts in her house have been dirty. She has been using the saline spray and it helps some.     Prescribed zyrtec  Continue saline spray

## 2024-07-02 NOTE — ASSESSMENT & PLAN NOTE
BP: 150/84    On lisinopril   Increased lisinopril to 40mg       Hypertension- Denies CP, SOA, dizziness, lightheadedness, or HA. Compliant on medication. She checked her BP at home, but forgot to keep checking it.     On lisinopril   Increased lisinopril to 40mg     Keep BP log- Check BP 1-2 hours after taking medication   Bring BP monitor to next appointment   Encourage low sodium diet   Check BP if symptomatic- Chest pain, shortness of breath, dizziness, lightheadedness, heart palpitations, or headache

## 2024-07-02 NOTE — PATIENT INSTRUCTIONS
Increased lisinopril to 40mg     Keep BP log- Check BP 1-2 hours after taking medication   Bring BP monitor to next appointment   Encourage low sodium diet   Check BP if symptomatic- Chest pain, shortness of breath, dizziness, lightheadedness, heart palpitations, or headache      Prescribed zyrtec  Continue saline spray

## 2024-07-22 RX ORDER — LISINOPRIL 20 MG/1
20 TABLET ORAL DAILY
Qty: 30 TABLET | Refills: 0 | OUTPATIENT
Start: 2024-07-22

## 2024-07-24 RX ORDER — LISINOPRIL 40 MG/1
40 TABLET ORAL DAILY
Qty: 90 TABLET | Refills: 1 | Status: SHIPPED | OUTPATIENT
Start: 2024-07-24

## 2024-08-02 NOTE — PROGRESS NOTES
"Chief Complaint  Chief Complaint   Patient presents with    Hypertension     2wk f/u        Subjective          MinGenaro Magdaleno is a 73-year-old female who reports to the office today for follow-up on hypertension.    Hypertension- Denies CP, SOA, dizziness, lightheadedness, or HA. Compliant on medication. She has not been checking her BP at home, but brought her BP cuff to be educated on how to check it correctly.              Review of Systems   Constitutional:  Negative for chills and fever.   HENT:  Negative for congestion.    Respiratory:  Negative for shortness of breath.    Cardiovascular:  Negative for chest pain.   Gastrointestinal:  Negative for abdominal pain, nausea and vomiting.   Genitourinary:  Negative for difficulty urinating.   Musculoskeletal:  Positive for myalgias.   Skin: Negative.    Neurological:  Negative for dizziness, light-headedness and headache.        Objective   Vital Signs:   Vitals:    08/05/24 1300   BP: 157/88   Pulse:    Temp:    SpO2:       Estimated body mass index is 37.89 kg/m² as calculated from the following:    Height as of this encounter: 170.2 cm (67.01\").    Weight as of this encounter: 110 kg (242 lb).                          Physical Exam  Vitals reviewed.   Constitutional:       Appearance: Normal appearance. She is obese.   HENT:      Head: Normocephalic and atraumatic.      Nose: Nose normal.      Mouth/Throat:      Mouth: Mucous membranes are moist.      Pharynx: Oropharynx is clear.   Eyes:      Extraocular Movements: Extraocular movements intact.      Conjunctiva/sclera: Conjunctivae normal.      Comments: Wears glasses    Cardiovascular:      Rate and Rhythm: Normal rate and regular rhythm.      Pulses: Normal pulses.      Heart sounds: Murmur heard.      Comments: S1, S2 audible  Pulmonary:      Effort: Pulmonary effort is normal.      Breath sounds: Normal breath sounds.      Comments: On room air   Abdominal:      General: Abdomen is flat. "   Musculoskeletal:         General: Normal range of motion.      Cervical back: Normal range of motion.   Skin:     General: Skin is warm and dry.   Neurological:      General: No focal deficit present.      Mental Status: She is alert and oriented to person, place, and time. Mental status is at baseline.   Psychiatric:         Mood and Affect: Mood normal.         Behavior: Behavior normal.         Thought Content: Thought content normal.         Judgment: Judgment normal.                Physical Exam   Result Review :             Procedures       Assessment and Plan     Diagnoses and all orders for this visit:    1. Vitamin D deficiency (Primary)  -     Vitamin D 25 hydroxy    2. Other fatigue  -     Vitamin B12    3. Muscle ache  -     Basic metabolic panel  -     Magnesium    4. Primary hypertension  Assessment & Plan:  BP: 151/75, 157/88    On lisinopril     Hypertension- Denies CP, SOA, dizziness, lightheadedness, or HA. Compliant on medication. She has not been checking her BP at home, but brought her BP cuff to be educated on how to check it correctly.     Continue lisinopril  Patient does not want to start a new medication at this time, she wants to change her diet    Keep BP log- Check BP 1-2 hours after taking medication   Encourage low sodium diet   Check BP if symptomatic- Chest pain, shortness of breath, dizziness, lightheadedness, heart palpitations, or headache        5. Anxiety with depression  Assessment & Plan:  Her  passed away 3 years ago and she is not motivated. She does not have energy and is tearful.     Encouraged grief counseling- daughter in office with patient                 Follow Up   Return in about 3 months (around 11/5/2024) for Medicare Wellness.   Patient was given instructions and counseling regarding her condition or for health maintenance advice. Please see specific information pulled into the AVS if appropriate.

## 2024-08-05 ENCOUNTER — OFFICE VISIT (OUTPATIENT)
Dept: FAMILY MEDICINE CLINIC | Facility: CLINIC | Age: 74
End: 2024-08-05
Payer: MEDICARE

## 2024-08-05 VITALS
WEIGHT: 242 LBS | SYSTOLIC BLOOD PRESSURE: 157 MMHG | BODY MASS INDEX: 37.98 KG/M2 | OXYGEN SATURATION: 97 % | HEIGHT: 67 IN | TEMPERATURE: 98.4 F | DIASTOLIC BLOOD PRESSURE: 88 MMHG | HEART RATE: 79 BPM

## 2024-08-05 DIAGNOSIS — M79.10 MUSCLE ACHE: ICD-10-CM

## 2024-08-05 DIAGNOSIS — I10 PRIMARY HYPERTENSION: ICD-10-CM

## 2024-08-05 DIAGNOSIS — F41.8 ANXIETY WITH DEPRESSION: ICD-10-CM

## 2024-08-05 DIAGNOSIS — R53.83 OTHER FATIGUE: ICD-10-CM

## 2024-08-05 DIAGNOSIS — E55.9 VITAMIN D DEFICIENCY: Primary | ICD-10-CM

## 2024-08-05 PROBLEM — Z76.89 ENCOUNTER TO ESTABLISH CARE: Status: RESOLVED | Noted: 2024-06-14 | Resolved: 2024-08-05

## 2024-08-05 LAB
ANION GAP SERPL CALCULATED.3IONS-SCNC: 8.7 MMOL/L (ref 5–15)
BUN SERPL-MCNC: 28 MG/DL (ref 8–23)
BUN/CREAT SERPL: 36.4 (ref 7–25)
CALCIUM SPEC-SCNC: 9.9 MG/DL (ref 8.6–10.5)
CHLORIDE SERPL-SCNC: 105 MMOL/L (ref 98–107)
CO2 SERPL-SCNC: 25.3 MMOL/L (ref 22–29)
CREAT SERPL-MCNC: 0.77 MG/DL (ref 0.57–1)
EGFRCR SERPLBLD CKD-EPI 2021: 82.1 ML/MIN/1.73
GLUCOSE SERPL-MCNC: 88 MG/DL (ref 65–99)
MAGNESIUM SERPL-MCNC: 2.2 MG/DL (ref 1.6–2.4)
POTASSIUM SERPL-SCNC: 4.3 MMOL/L (ref 3.5–5.2)
SODIUM SERPL-SCNC: 139 MMOL/L (ref 136–145)

## 2024-08-05 PROCEDURE — 36415 COLL VENOUS BLD VENIPUNCTURE: CPT | Performed by: NURSE PRACTITIONER

## 2024-08-05 PROCEDURE — 3078F DIAST BP <80 MM HG: CPT | Performed by: NURSE PRACTITIONER

## 2024-08-05 PROCEDURE — 83735 ASSAY OF MAGNESIUM: CPT | Performed by: NURSE PRACTITIONER

## 2024-08-05 PROCEDURE — 3077F SYST BP >= 140 MM HG: CPT | Performed by: NURSE PRACTITIONER

## 2024-08-05 PROCEDURE — 1160F RVW MEDS BY RX/DR IN RCRD: CPT | Performed by: NURSE PRACTITIONER

## 2024-08-05 PROCEDURE — 80048 BASIC METABOLIC PNL TOTAL CA: CPT | Performed by: NURSE PRACTITIONER

## 2024-08-05 PROCEDURE — 99214 OFFICE O/P EST MOD 30 MIN: CPT | Performed by: NURSE PRACTITIONER

## 2024-08-05 PROCEDURE — 82607 VITAMIN B-12: CPT | Performed by: NURSE PRACTITIONER

## 2024-08-05 PROCEDURE — 82306 VITAMIN D 25 HYDROXY: CPT | Performed by: NURSE PRACTITIONER

## 2024-08-05 PROCEDURE — 1159F MED LIST DOCD IN RCRD: CPT | Performed by: NURSE PRACTITIONER

## 2024-08-05 PROCEDURE — 1126F AMNT PAIN NOTED NONE PRSNT: CPT | Performed by: NURSE PRACTITIONER

## 2024-08-05 NOTE — ASSESSMENT & PLAN NOTE
BP: 151/75, 157/88    On lisinopril     Hypertension- Denies CP, SOA, dizziness, lightheadedness, or HA. Compliant on medication. She has not been checking her BP at home, but brought her BP cuff to be educated on how to check it correctly.     Continue lisinopril  Patient does not want to start a new medication at this time, she wants to change her diet    Keep BP log- Check BP 1-2 hours after taking medication   Encourage low sodium diet   Check BP if symptomatic- Chest pain, shortness of breath, dizziness, lightheadedness, heart palpitations, or headache

## 2024-08-05 NOTE — PATIENT INSTRUCTIONS
Keep BP log- Check BP 1-2 hours after taking medication   Notify me if BP stays above 150 consistently   Encourage low sodium diet - Education given   Check BP if symptomatic- Chest pain, shortness of breath, dizziness, lightheadedness, heart palpitations, or headache      Follow-up medicare wellness, must be fasting- 8 hours prior, may have water or black coffee before     Encouraged grief counseling

## 2024-08-05 NOTE — PROGRESS NOTES
Venipuncture performed in R ARM by RT Jennifer  with good hemostasis. Patient tolerated well. 08/05/24 Leah Zhou, RT

## 2024-08-05 NOTE — ASSESSMENT & PLAN NOTE
Her  passed away 3 years ago and she is not motivated. She does not have energy and is tearful.     Encouraged grief counseling- daughter in office with patient

## 2024-08-06 PROBLEM — E55.9 VITAMIN D DEFICIENCY: Status: ACTIVE | Noted: 2024-08-06

## 2024-08-06 LAB
25(OH)D3 SERPL-MCNC: 28.8 NG/ML (ref 30–100)
VIT B12 BLD-MCNC: 808 PG/ML (ref 211–946)

## 2024-08-06 NOTE — PROGRESS NOTES
Please call the patient:  Her vitamin D is a little low I would recommend taking a multivitamin daily.    Her BUN-her kidney function lab is just a little elevated.  I have encouraged her to stay hydrated.  This is likely what is causing her muscle aches.  If she has any further questions or concerns she can reach out.

## 2024-11-04 RX ORDER — LISINOPRIL 40 MG/1
40 TABLET ORAL DAILY
Qty: 90 TABLET | Refills: 1 | Status: SHIPPED | OUTPATIENT
Start: 2024-11-04

## 2024-11-04 NOTE — TELEPHONE ENCOUNTER
Caller: Av MagdalenoVidhyaJordan    Relationship: Self    Best call back number: [phone]    Requested Prescriptions:   Requested Prescriptions     Pending Prescriptions Disp Refills    lisinopril (PRINIVIL,ZESTRIL) 40 MG tablet 90 tablet 1     Sig: Take 1 tablet by mouth Daily.        Pharmacy where request should be sent: [facility] #[id number] [address] AT [address] [phone] [facility] [phone]      Last office visit with prescribing clinician: 8/5/2024   Last telemedicine visit with prescribing clinician: Visit date not found   Next office visit with prescribing clinician: 2/11/2025     Additional details provided by patient:     Does the patient have less than a 3 day supply:  [x] Yes  [] No    Would you like a call back once the refill request has been completed: [] Yes [] No    If the office needs to give you a call back, can they leave a voicemail: [] Yes [] No    Mark Thorne Rep   11/04/24 12:37 EST

## 2025-02-10 PROBLEM — Z00.00 ENCOUNTER FOR SUBSEQUENT ANNUAL WELLNESS VISIT IN MEDICARE PATIENT: Status: ACTIVE | Noted: 2025-02-10

## 2025-02-10 PROBLEM — Z00.00 PREVENTATIVE HEALTH CARE: Status: ACTIVE | Noted: 2025-02-10

## 2025-02-12 NOTE — ASSESSMENT & PLAN NOTE
Check hemoglobin A1c and CMP   Encouraged to watch carb and sugar intake    Pre-diabetes- She watches her carb and sugar intake.       Orders:    Hepatitis C Antibody

## 2025-02-12 NOTE — ASSESSMENT & PLAN NOTE
BP: 157/76, 173/83    On lisinopril-monitor kidney function     HTN- Denies CP, SOA, dizziness, or HA. She reports about once a month she will get lightheadedness. She has been compliant on medication. She has not been checking her BP.     Keep BP log for 7 days and call office with results

## 2025-02-12 NOTE — ASSESSMENT & PLAN NOTE
Thrombocytosis- she is not taken the agrylin. She reports she stopped seeing hematology because she wasn't feel well and her problems weren't addressed.     Check CBC  Consider new referral to hematology       Orders:    CBC & Differential

## 2025-02-12 NOTE — ASSESSMENT & PLAN NOTE
Check vitamin D level    Vitamin D defiency- She reports she started to take the D3 again.     Orders:    Vitamin D,25-Hydroxy

## 2025-02-12 NOTE — ASSESSMENT & PLAN NOTE
She denies any new family history. Family history: Her twin- cancer. Mother- diabetes. Father- MI, alcoholism.    Social history:Denies smoking, drinking, or illegal drug use.     Labs-Due  Colonoscopy-Due   DEXA-Refused  Mammogram-Refused       Vaccines:  Flu-Refused  Shingles-Refused     Dental exam-Due   Eye exam-Due      Encourage healthy diet and exercise  Encouraged monthly self breast exams  Check labs  Encourage to get cologuard done within 1 month    Orders:    Comprehensive Metabolic Panel    Hemoglobin A1c    Lipid Panel

## 2025-02-14 ENCOUNTER — OFFICE VISIT (OUTPATIENT)
Dept: FAMILY MEDICINE CLINIC | Facility: CLINIC | Age: 75
End: 2025-02-14
Payer: MEDICARE

## 2025-02-14 VITALS
HEIGHT: 67 IN | TEMPERATURE: 98 F | OXYGEN SATURATION: 100 % | DIASTOLIC BLOOD PRESSURE: 83 MMHG | WEIGHT: 245 LBS | SYSTOLIC BLOOD PRESSURE: 173 MMHG | BODY MASS INDEX: 38.45 KG/M2 | RESPIRATION RATE: 18 BRPM | HEART RATE: 83 BPM

## 2025-02-14 DIAGNOSIS — D75.839 THROMBOCYTOSIS: Primary | ICD-10-CM

## 2025-02-14 DIAGNOSIS — F41.8 ANXIETY WITH DEPRESSION: ICD-10-CM

## 2025-02-14 DIAGNOSIS — I10 PRIMARY HYPERTENSION: ICD-10-CM

## 2025-02-14 DIAGNOSIS — M79.604 BILATERAL LEG PAIN: ICD-10-CM

## 2025-02-14 DIAGNOSIS — M79.10 MUSCLE ACHE: ICD-10-CM

## 2025-02-14 DIAGNOSIS — Z00.00 PREVENTATIVE HEALTH CARE: ICD-10-CM

## 2025-02-14 DIAGNOSIS — E66.9 OBESITY (BMI 30-39.9): ICD-10-CM

## 2025-02-14 DIAGNOSIS — M79.605 BILATERAL LEG PAIN: ICD-10-CM

## 2025-02-14 DIAGNOSIS — R30.0 DYSURIA: ICD-10-CM

## 2025-02-14 DIAGNOSIS — E55.9 VITAMIN D DEFICIENCY: ICD-10-CM

## 2025-02-14 DIAGNOSIS — Z00.00 ENCOUNTER FOR SUBSEQUENT ANNUAL WELLNESS VISIT IN MEDICARE PATIENT: ICD-10-CM

## 2025-02-14 DIAGNOSIS — Z77.011 LEAD EXPOSURE: ICD-10-CM

## 2025-02-14 DIAGNOSIS — R35.0 URINARY FREQUENCY: ICD-10-CM

## 2025-02-14 DIAGNOSIS — R73.03 PRE-DIABETES: ICD-10-CM

## 2025-02-14 LAB
BILIRUB BLD-MCNC: NEGATIVE MG/DL
CLARITY, POC: CLEAR
COLOR UR: YELLOW
EXPIRATION DATE: ABNORMAL
GLUCOSE UR STRIP-MCNC: NEGATIVE MG/DL
KETONES UR QL: NEGATIVE
LEUKOCYTE EST, POC: ABNORMAL
Lab: ABNORMAL
NITRITE UR-MCNC: NEGATIVE MG/ML
PH UR: 5.5 [PH] (ref 5–8)
PROT UR STRIP-MCNC: NEGATIVE MG/DL
RBC # UR STRIP: ABNORMAL /UL
SP GR UR: 1.02 (ref 1–1.03)
UROBILINOGEN UR QL: NORMAL

## 2025-02-14 PROCEDURE — 87086 URINE CULTURE/COLONY COUNT: CPT | Performed by: NURSE PRACTITIONER

## 2025-02-14 PROCEDURE — G0439 PPPS, SUBSEQ VISIT: HCPCS | Performed by: NURSE PRACTITIONER

## 2025-02-14 PROCEDURE — 36415 COLL VENOUS BLD VENIPUNCTURE: CPT | Performed by: NURSE PRACTITIONER

## 2025-02-14 PROCEDURE — 1170F FXNL STATUS ASSESSED: CPT | Performed by: NURSE PRACTITIONER

## 2025-02-14 PROCEDURE — 99214 OFFICE O/P EST MOD 30 MIN: CPT | Performed by: NURSE PRACTITIONER

## 2025-02-14 PROCEDURE — 1126F AMNT PAIN NOTED NONE PRSNT: CPT | Performed by: NURSE PRACTITIONER

## 2025-02-14 PROCEDURE — 83036 HEMOGLOBIN GLYCOSYLATED A1C: CPT | Performed by: NURSE PRACTITIONER

## 2025-02-14 PROCEDURE — 80053 COMPREHEN METABOLIC PANEL: CPT | Performed by: NURSE PRACTITIONER

## 2025-02-14 PROCEDURE — 3078F DIAST BP <80 MM HG: CPT | Performed by: NURSE PRACTITIONER

## 2025-02-14 PROCEDURE — 85025 COMPLETE CBC W/AUTO DIFF WBC: CPT | Performed by: NURSE PRACTITIONER

## 2025-02-14 PROCEDURE — 3077F SYST BP >= 140 MM HG: CPT | Performed by: NURSE PRACTITIONER

## 2025-02-14 PROCEDURE — 96160 PT-FOCUSED HLTH RISK ASSMT: CPT | Performed by: NURSE PRACTITIONER

## 2025-02-14 PROCEDURE — 86803 HEPATITIS C AB TEST: CPT | Performed by: NURSE PRACTITIONER

## 2025-02-14 PROCEDURE — 81003 URINALYSIS AUTO W/O SCOPE: CPT | Performed by: NURSE PRACTITIONER

## 2025-02-14 PROCEDURE — 83735 ASSAY OF MAGNESIUM: CPT | Performed by: NURSE PRACTITIONER

## 2025-02-14 PROCEDURE — 80061 LIPID PANEL: CPT | Performed by: NURSE PRACTITIONER

## 2025-02-14 PROCEDURE — 82306 VITAMIN D 25 HYDROXY: CPT | Performed by: NURSE PRACTITIONER

## 2025-02-14 RX ORDER — CEFDINIR 300 MG/1
300 CAPSULE ORAL 2 TIMES DAILY
Qty: 14 CAPSULE | Refills: 0 | Status: SHIPPED | OUTPATIENT
Start: 2025-02-14

## 2025-02-14 NOTE — ASSESSMENT & PLAN NOTE
Anxiety with depression- She reports it has been bad, but she feels like she is ok now. Denies SI or HI. She does not want to see a counselor.

## 2025-02-14 NOTE — PATIENT INSTRUCTIONS
Encourage healthy diet and exercise  Encouraged monthly self breast exams  Check labs  Encourage to get cologuard done within 1 month  Give us a copy of ACP   Keep BP log for 7 days and call office with results

## 2025-02-14 NOTE — ASSESSMENT & PLAN NOTE
Urinary frequency- She reports this started 3-4 months ago and it has continued 3 times of getting up in the middle of the night. Crispin hematuria, fever, or burning on urination.     Check UA- small leukocytes, trace blood    Prescribed omnicef

## 2025-02-14 NOTE — PROGRESS NOTES
Venipuncture performed in L ARM by RT Jennifer  with good hemostasis. Patient tolerated well. 02/14/25 Leah Zhou, RT

## 2025-02-14 NOTE — ASSESSMENT & PLAN NOTE
Patient's (Body mass index is 38.37 kg/m².)    Obesity- She exercises everyday. She tries to eat a healthy diet.     Encourage healthy diet and exercise

## 2025-02-14 NOTE — ASSESSMENT & PLAN NOTE
Advance Care Planning   ACP discussion was held with the patient during this visit. Patient has an advance directive (not in EMR), copy requested.

## 2025-02-14 NOTE — ASSESSMENT & PLAN NOTE
She reports bilateral thigh pain.     Encourage exercise   Check CMP and mag   Consider venous duplex

## 2025-02-15 LAB
25(OH)D3 SERPL-MCNC: 30.9 NG/ML (ref 30–100)
ALBUMIN SERPL-MCNC: 3.5 G/DL (ref 3.5–5.2)
ALBUMIN/GLOB SERPL: 0.8 G/DL
ALP SERPL-CCNC: 97 U/L (ref 39–117)
ALT SERPL W P-5'-P-CCNC: 12 U/L (ref 1–33)
ANION GAP SERPL CALCULATED.3IONS-SCNC: 10.3 MMOL/L (ref 5–15)
AST SERPL-CCNC: 21 U/L (ref 1–32)
BASOPHILS # BLD AUTO: 0.04 10*3/MM3 (ref 0–0.2)
BASOPHILS NFR BLD AUTO: 0.5 % (ref 0–1.5)
BILIRUB SERPL-MCNC: 0.5 MG/DL (ref 0–1.2)
BUN SERPL-MCNC: 24 MG/DL (ref 8–23)
BUN/CREAT SERPL: 27.6 (ref 7–25)
CALCIUM SPEC-SCNC: 10.3 MG/DL (ref 8.6–10.5)
CHLORIDE SERPL-SCNC: 103 MMOL/L (ref 98–107)
CHOLEST SERPL-MCNC: 172 MG/DL (ref 0–200)
CO2 SERPL-SCNC: 26.7 MMOL/L (ref 22–29)
CREAT SERPL-MCNC: 0.87 MG/DL (ref 0.57–1)
DEPRECATED RDW RBC AUTO: 40.5 FL (ref 37–54)
EGFRCR SERPLBLD CKD-EPI 2021: 70.5 ML/MIN/1.73
EOSINOPHIL # BLD AUTO: 0.11 10*3/MM3 (ref 0–0.4)
EOSINOPHIL NFR BLD AUTO: 1.3 % (ref 0.3–6.2)
ERYTHROCYTE [DISTWIDTH] IN BLOOD BY AUTOMATED COUNT: 13.7 % (ref 12.3–15.4)
GLOBULIN UR ELPH-MCNC: 4.5 GM/DL
GLUCOSE SERPL-MCNC: 92 MG/DL (ref 65–99)
HBA1C MFR BLD: 5.9 % (ref 4.8–5.6)
HCT VFR BLD AUTO: 41.7 % (ref 34–46.6)
HCV AB SER QL: NORMAL
HDLC SERPL-MCNC: 71 MG/DL (ref 40–60)
HGB BLD-MCNC: 12.8 G/DL (ref 12–15.9)
IMM GRANULOCYTES # BLD AUTO: 0.03 10*3/MM3 (ref 0–0.05)
IMM GRANULOCYTES NFR BLD AUTO: 0.3 % (ref 0–0.5)
LDLC SERPL CALC-MCNC: 90 MG/DL (ref 0–100)
LDLC/HDLC SERPL: 1.26 {RATIO}
LYMPHOCYTES # BLD AUTO: 1.96 10*3/MM3 (ref 0.7–3.1)
LYMPHOCYTES NFR BLD AUTO: 22.8 % (ref 19.6–45.3)
MAGNESIUM SERPL-MCNC: 2.1 MG/DL (ref 1.6–2.4)
MCH RBC QN AUTO: 25.4 PG (ref 26.6–33)
MCHC RBC AUTO-ENTMCNC: 30.7 G/DL (ref 31.5–35.7)
MCV RBC AUTO: 82.7 FL (ref 79–97)
MONOCYTES # BLD AUTO: 0.61 10*3/MM3 (ref 0.1–0.9)
MONOCYTES NFR BLD AUTO: 7.1 % (ref 5–12)
NEUTROPHILS NFR BLD AUTO: 5.84 10*3/MM3 (ref 1.7–7)
NEUTROPHILS NFR BLD AUTO: 68 % (ref 42.7–76)
NRBC BLD AUTO-RTO: 0 /100 WBC (ref 0–0.2)
PLATELET # BLD AUTO: 956 10*3/MM3 (ref 140–450)
PMV BLD AUTO: 10.2 FL (ref 6–12)
POTASSIUM SERPL-SCNC: 4.4 MMOL/L (ref 3.5–5.2)
PROT SERPL-MCNC: 8 G/DL (ref 6–8.5)
RBC # BLD AUTO: 5.04 10*6/MM3 (ref 3.77–5.28)
SODIUM SERPL-SCNC: 140 MMOL/L (ref 136–145)
TRIGL SERPL-MCNC: 56 MG/DL (ref 0–150)
VLDLC SERPL-MCNC: 11 MG/DL (ref 5–40)
WBC NRBC COR # BLD AUTO: 8.59 10*3/MM3 (ref 3.4–10.8)

## 2025-02-16 LAB — BACTERIA SPEC AEROBE CULT: NO GROWTH

## 2025-02-17 DIAGNOSIS — D47.1 MYELOPROLIFERATIVE NEOPLASM: Primary | ICD-10-CM

## 2025-02-17 DIAGNOSIS — D75.839 THROMBOCYTOSIS: ICD-10-CM

## 2025-02-17 NOTE — PROGRESS NOTES
Please call patient and let her know her hemoglobin A1c is 5.9 in the prediabetic range.  She needs to continue to watch her carb and sugar intake.    Her platelets are extremely elevated at 956.  I am going to make a referral to Dr. Galarza.  They would be calling her to schedule an appointment.    Her BUN-kidney lab is slightly elevated indicating she is slightly dehydrated.  I encourage hydration.    Her cholesterol panel looked good.  Her vitamin D was normal.    If she has any further questions or concerns she can reach out.

## 2025-03-26 PROBLEM — D47.3 ESSENTIAL THROMBOCYTHEMIA: Status: ACTIVE | Noted: 2025-03-26

## 2025-03-26 NOTE — PROGRESS NOTES
HEMATOLOGY ONCOLOGY OUTPATIENT CONSULTATION       Patient name: Ileana Magdaleno  : 1950  MRN: [id number]  Primary Care Physician: Faby Keene APRN  Referring Physician: Faby Keene APRN  Reason For Consult: thrombocytosis-myeloproliferative disorder (essential thrombocythemia)      History of Present Illness:  Patient is a 74 y.o. female with past medical history of primary hypertension, dysuria, obesity (BMI 30-39.9), pre-diabetes, anxiety with depression, vitamin D deficiency, lead exposure, urinary frequency with a history of myeloproliferative disorder (essential thrombocythemia) presenting with thrombocytosis.    Hematology History  -12/15/2021 presented as a 69 y/o female for initial evaluation of thrombocytosis at [facility]. During her recent visit with Dr. Desir, labs were done which showed platelet count 1.7 million.  She denies any history of thrombosis, abdominal pain or early satiety, fever, chills, nausea or vomiting, weight loss, or bleeding problems. Patient reported left foot pain as she injured her left foot and was supposed to go for x-ray, and not able to bear weight on this foot and using wheelchair.    -2021 WBC 11.13 (neutrophils 64%, CMP is unremarkable lymphocyte 26%, monocytes 7%), Hgb 13.8, MCV 87.7, PLT 1783K,      -12/15/2021 WBC 11.57 (neutrophils 77%, lymphocytes 12%, monocytes 8%), Hgb 13.3, PLT 1617K, iron 32, iron saturation 16%, TIBC 204, ferritin 223.     -2021 WBC 11.04, Hgb 13.4, Plt 1717K      -2021 Bone marrow biopsy (at [facility]): showed chronic myeloid neoplasm involving a hypercellular marrow (50%) and no increase in blasts (1%). Megakaryocytic hyperplasia and atypia noted. Relative erythroid hypoplasia. No evidence of acute leukemia lymphoma or plasma cell neoplasm. Mild increased iron stores and no ring sideroblasts. No reticulin or collagenous fibrosis. The BCR/ABL1 translocation is not detected.  "  Pathologist comment:   The overall findings are consistent with involvement by a chronic myeloid neoplasm in which a myeloproliferative neoplasm (MPN) such as ET or PV is   favored. BCR-ABL1 FISH studies are negative (see [id number]), and the overall morphologic findings are not consistent with an atypical CML. -Chromosome analysis reveals normal karotype.  -No pathogenic single nucleotide variants, indels, or copy number changes found in: JAK2, CALR, MPL, CSF3R, TMB 2.1 m/MB.      -1/14/22: WBC 6.41, Hgb 12.8, Plt 1205 (prev PLT 1717), CMP essentially wnl  -1/14/2022 Venous Doppler LLE: from showed no evidence of DVT.  -2/10/22: WBC 6.88, Hgb 12.8, Plt 854 (prev PLT 1205), CMP essentially wnl     -3/24/2022 WBC 5.74, Hgb 11.9,   -5/26/2022 WBC 8.71, Hgb 12.3,   -9/8/2022 WBC 7.75, Hgb 12.5, ,   -10/6/2022 WBC 5.82, Hgb 12.1, ,   -12/1/2022 WBC 4.10, Hgb 12.2, ,   -5/5/2023 WBC 3.75 (ANC 2440), Hgb 11.9, HCT 37.2, , CMP essentially wnl  -8/4/2023 WBC 8.19, Hgb 12.7, .2, , CMP unremarkable except BUN 27  -11/8/2023 WBC 8.24, Hgb 12.3, , .     -1/20/2024 WBC 4.58 (Neutrophils 66%, lymphocyte 27%, monocyte 5%), Hgb 10.5, , . CMP is unremarkable.  -5/2/2024 WBC 5.85, Hgb 12.4, Plt 731     Hydrea was stopped on 5/2/2024 due to intolerance, anegrelide started.     -6/27/2024 last documented follow up Dr Puga: Hydrea was stopped at previous visit due to intolerable side effects of pt stating she had a \"terrible feeling in her head\" on Hydrea. She had been started on Anagrelide TID and feeling much better since stopping Hydrea and feels like her normal self. She is having some knee joint pain but otherwise doing ok with no fever chills, CP, SOA. Her energy is good and weight is stable. She is taking ASA 81 mg bid and reports good tolerance and compliance to this medication. A/P: Dr Puga: Continue Anagrelide " "(increase dose to 1 mg po bid (patient reports she has plenty of 0.5 mg anagrelide tablets.  She will be taking 2 tablets twice daily).  -continue EC aspirin 81 mg po bid    -2/14/2025 follow up with PCP for annual wellness exam, per her notes \"Thrombocytosis- she is not taken the agrylin. She reports she stopped seeing hematology because she wasn't feel well and her problems weren't addressed\" She also has noted pt has been declining vaccines (flu, shingles), DEXA, with colonoscopy, dental, eye exam all due.  -2/14/2025: WBC 8.59 (neutrophils 68%, lymphocytes 22.8%, monocytes 7.1%, eosinophil 1.3%, basophil 0.5%, immature granulocytes 0.3%), hgb 12.8/41.7, plts 956K, CMP WNL except for BUN 24/Cr 0.87.    Subjective:  -3/31/2025 Patient presents for initial consultation as a transfer of care with her \"favorite\" daughter (per daughter), she denies any history of MI/CVA/TIA or blood clots.  Reports as a new patient as her primary care noticed that her platelets have been uptrending to the 900,000's, and she was no longer following with her primary oncologist.  She reports that he was \"all over the place\".  She denies any fevers, chills, drenching night sweats, unintentional weight loss, bruising or bleeding.  Taking aspirin (enteric-coated).      Past Medical History:   Diagnosis Date    Anxiety        History reviewed. No pertinent surgical history.      Current Outpatient Medications:     aspirin 81 MG EC tablet, Take 1 tablet by mouth 2 (Two) Times a Day., Disp: , Rfl:     cetirizine (zyrTEC) 10 MG tablet, Take 1 tablet by mouth Daily. (Patient taking differently: Take 1 tablet by mouth As Needed.), Disp: 90 tablet, Rfl: 3    lisinopril (PRINIVIL,ZESTRIL) 40 MG tablet, Take 1 tablet by mouth Daily., Disp: 90 tablet, Rfl: 1    albuterol sulfate  (90 Base) MCG/ACT inhaler, Inhale 2 puffs Every 6 (Six) Hours As Needed for Shortness of Air. (Patient not taking: Reported on 3/31/2025), Disp: 18 g, Rfl: 0    " anagrelide (AGRYLIN) 0.5 MG capsule, Take 2 capsules by mouth 2 (Two) Times a Day., Disp: 60 capsule, Rfl: 6    cefdinir (OMNICEF) 300 MG capsule, Take 1 capsule by mouth 2 (Two) Times a Day. (Patient not taking: Reported on 3/31/2025), Disp: 14 capsule, Rfl: 0    No Known Allergies    Family History   Problem Relation Age of Onset    Heart disease Father      Cancer-related family history is not on file.    Social History     Tobacco Use    Smoking status: Never    Smokeless tobacco: Never   Vaping Use    Vaping status: Never Used   Substance Use Topics    Alcohol use: Never    Drug use: Never     Social History     Social History Narrative    Not on file       Review of Systems:  Constitutional: +fatigue, Denies any weakness, lack of appetite, excessive appetite, weight change, chills or fever.  Eyes: Reports no blurry vision, no double vision, no pain in the eyes, dry eyes or tearing.  ENT: Reports no decreased hearing capacity, ear pain or tinnitus. Denies any epistaxis, nasal congestion, mouth sores or bleeding, tooth or jaw pain, sore throat or hoarseness.  Respiratory: Denies any cough, sputum production or hemoptysis. There is no wheezing, shortness of breath or any other respiratory complaints.  Cardiovascular: Denies any chest pain, shortness of breath when lying down, shortness of breath with activity, palpitations, or leg swelling.  Breasts: Denies any lumps, bumps, pain, nipple changes or discharge in the breasts.  Gastrointestinal: There are no complaints of abdominal pain, difficulty swallowing or painful swallowing, anorexia, nausea, vomiting, diarrhea, constipation, heartburn, blood in the stools, dark stools, or change in bowel habits or hemorrhoids.  Genitourinary: Denies any pain or burning on urination, blood in the urine or frequent urination.   Musculoskeletal: Denies painful joints, no swelling of the joints, muscle or back pain. Denies any pain or tingling in the legs, hands or  "feet.  Neuropsychiatric: Denies any nervousness, depressed mood, headaches, blackouts, dizziness, weakness of limbs, loss of sensation, loss of balance, loss of coordination or difficulty in speaking.  Cutaneous: +itching, Denies any dry skin,  rash, change in the color of the skin, or any other cutaneous complaints.  Hematologic/Lymphatic: Denies any progressive bruising or bleeding. Report no recent development of palpable or painful lymph nodes.  Allergy/Immunology: Denies rash/hayfever symptoms or any recent history of pneumonia, recurrent sinusitis, urinary infection or any other history of an ongoing infection.  Endocrine: Reports no intolerance to heat or cold, excessive thirst or excessive urination.      Objective:    Vital Signs:  Vitals:    25 1414   BP: 154/78   Pulse: 92   Temp: 98 °F (36.7 °C)   TempSrc: Oral   SpO2: 98%   Weight: 112 kg (247 lb 3.2 oz)   Height: 170.2 cm (67.01\")   PainSc: 6    PainLoc: Knee  Comment: left     Body mass index is 38.71 kg/m².      Physical Exam:   ECO  GENERAL: The patient is a pleasant elderly overweight female who appears their stated age and is awake, in no acute distress, alert and oriented x3.  SKIN: No rash or ulcers.   HEME/LYMPHATICS: No bruising or petechiae on visual inspection. No lymphadenopathy on visual inspection and palpation of cervical lymph nodes.  HEAD/FACE: atraumatic and normocephalic. Normal hair.  EYES: PERRLA/EOMI. No scleral icterus. No tearing or dry eyes.  ENT: External ears normal with no evidence of discharge. No evidence of ulceration or bleeding in the nostrils. Mouth has no ulcers, bleeding or inflammation of the gums, floor or roof of the mouth with normal dentition for age.  NECK: Supple, symmetric.   CHEST/RESPIRATORY: Expansion maintained bilaterally and symmetrically. On auscultation, clear breath sounds in both lungs. No wheezes, rhonchi or rales.  BREAST: Deferred.  CARDIOVASCULAR: On auscultation, regular rate and " rhythm. No murmurs, gallops or rubs are heard.  GASTROINTESTINAL/ABDOMEN: Symmetric. On auscultation of the abdomen, there are normal bowel sounds. Non-tender to palpation.  GENITOURINARY: Deferred.  NEUROLOGIC: Alert and oriented time, person, and place, with no apparent changes in recent or remote memory. Cranial nerves II-XII are grossly intact. Sensation is maintained in the extremities. Strength and tone appear normal for age and equal in the extremities. Gait is normal.  MUSCULOSKELETAL: No cyanosis, clubbing or edema in the extremities.   PSYCHIATRIC: The patient maintains judgment and has good insight. The patient has no changes in mood or affection.      Lab Results - Last 18 Months   Lab Units 03/31/25  1517 02/14/25  1203 06/27/24  0825   WBC 10*3/mm3 9.55 8.59 6.90   HEMOGLOBIN g/dL 12.8 12.8 12.7   HEMATOCRIT % 42.9 41.7 41.0   PLATELETS 10*3/mm3 714* 956* 619*   MCV fL 87.9 82.7 99.3     Lab Results - Last 18 Months   Lab Units 03/31/25  1517 02/14/25  1203 08/05/24  1325 06/14/24  0954   SODIUM mmol/L 141 140 139 144   POTASSIUM mmol/L 3.9 4.4 4.3 4.3   CHLORIDE mmol/L 103 103 105 108*   CO2 mmol/L 27.8 26.7 25.3 26.0   BUN mg/dL 28* 24* 28* 23   CREATININE mg/dL 0.83 0.87 0.77 0.84   CALCIUM mg/dL 9.9 10.3 9.9 9.8   BILIRUBIN mg/dL 0.3 0.5  --  0.4   ALK PHOS U/L 103 97  --  98   ALT (SGPT) U/L 13 12  --  13   AST (SGOT) U/L 20 21  --  18   GLUCOSE mg/dL 101* 92 88 100*       Lab Results   Component Value Date    GLUCOSE 101 (H) 03/31/2025    BUN 28 (H) 03/31/2025    CREATININE 0.83 03/31/2025    EGFRIFAFRI >60 10/06/2022    BCR 33.7 (H) 03/31/2025    K 3.9 03/31/2025    CO2 27.8 03/31/2025    CALCIUM 9.9 03/31/2025    ALBUMIN 4.1 03/31/2025    LABIL2 1.0 (L) 10/06/2022    AST 20 03/31/2025    ALT 13 03/31/2025       Lab Results - Last 18 Months   Lab Units 03/31/24  1422   INR  1.00   APTT seconds 29.8*       Lab Results   Component Value Date    IRON 45 03/31/2025    TIBC 343 03/31/2025    FERRITIN  "73.80 03/31/2025       Lab Results   Component Value Date    FOLATE 7.41 03/31/2025       No results found for: \"OCCULTBLD\"    Lab Results   Component Value Date    RETICCTPCT 1.55 03/31/2025     Lab Results   Component Value Date    XYIBDZEK00 409 03/31/2025     No results found for: \"SPEP\", \"UPEP\"  No results found for: \"LDH\", \"URICACID\"  No results found for: \"TRACEY\", \"RF\", \"SEDRATE\"  No results found for: \"FIBRINOGEN\", \"HAPTOGLOBIN\"  Lab Results   Component Value Date    PTT 29.8 (L) 03/31/2024    INR 1.00 03/31/2024     No results found for: \"\"  No results found for: \"CEA\"  No components found for: \"CA-19-9\"  No results found for: \"PSA\"  No results found for: \"SEDRATE\"       Assessment & Plan     74 y.o. female with past medical history of primary hypertension, dysuria, obesity (BMI 30-39.9), pre-diabetes, anxiety with depression, vitamin D deficiency, lead exposure, urinary frequency with a history of myeloproliferative disorder (essential thrombocythemia) presenting with thrombocytosis consistent with her ET.    1. Myeloproliferative neoplasm/triple negative essential thrombocythemia. Negative for JAK2, CALR, MPL, CSF3R and BCR/ABL1.     -12/22/2021 Bone marrow biopsy (at [facility]): showed chronic myeloid neoplasm involving a hypercellular marrow (50%) and no increase in blasts (1%). No evidence of acute leukemia lymphoma or plasma cell neoplasm. Mild increased iron stores and no ring sideroblasts. No reticulin or collagenous fibrosis. The BCR/ABL1 translocation not detected. Normal karotype. No pathogenic single nucleotide variants, indels, or copy number changes in: JAK2, CALR, MPL, CSF3R, TMB 2.1 m/MB.     -2/14/2025 follow up with PCP for annual wellness exam, per her notes \"Thrombocytosis- she is not taken the agrylin. She reports she stopped seeing hematology because she wasn't feel well and her problems weren't addressed\" She also has noted pt has been declining vaccines (flu, shingles), DEXA, with " "colonoscopy, dental, eye exam all due.   -2/14/2025 pt referred to [facility]  -2/14/2025: WBC 8.59 (neutrophils 68%, lymphocytes 22.8%, monocytes 7.1%, eosinophil 1.3%, basophil 0.5%, immature granulocytes 0.3%), hgb 12.8/41.7, plts 956K, CMP WNL except for BUN 24/Cr 0.87.    -Hydrea was stopped on 5/2/2024 due to intolerance (headaches).  -continue (reordered 3/31/2025) Anagrelide 1 mg po bid, with labs today and f/u at mid/end May with labs to evaluate if appropriate down-trending.  -continue EC aspirin 81 mg po bid    RTC: mid-end May with labs preferentially a few days prior to ensure Anegrelide dose is the correct dose    2. Hx Macrocytic anemia, resolved with treatment;  -2/14/2025 follow up with PCP for annual wellness exam, per her notes \"Thrombocytosis- she is not taken the agrylin. She reports she stopped seeing hematology because she wasn't feel well and her problems weren't addressed\" She also has noted pt has been declining vaccines (flu, shingles), DEXA, with colonoscopy, dental, eye exam all due.  -2/14/2025: WBC 8.59 (neutrophils 68%, lymphocytes 22.8%, monocytes 7.1%, eosinophil 1.3%, basophil 0.5%, immature granulocytes 0.3%), hgb 12.8/41.7, plts 956K, CMP WNL except for BUN 24/Cr 0.87.  -On consult today ordered CBC, CMP, iron profile, ferritin, folate, reticulocytes, B12 given her history of fatigue, history of microcytic anemia-will call with any significant results in interim.    -on RTC with CBC ordered...      Thank you very much for providing the opportunity to participate in this patient’s care. Please do not hesitate to call if there are any other questions.    "

## 2025-03-31 ENCOUNTER — CONSULT (OUTPATIENT)
Dept: ONCOLOGY | Facility: CLINIC | Age: 75
End: 2025-03-31
Payer: MEDICARE

## 2025-03-31 ENCOUNTER — LAB (OUTPATIENT)
Dept: LAB | Facility: HOSPITAL | Age: 75
End: 2025-03-31
Payer: MEDICARE

## 2025-03-31 VITALS
HEART RATE: 92 BPM | DIASTOLIC BLOOD PRESSURE: 78 MMHG | OXYGEN SATURATION: 98 % | BODY MASS INDEX: 38.8 KG/M2 | TEMPERATURE: 98 F | SYSTOLIC BLOOD PRESSURE: 154 MMHG | WEIGHT: 247.2 LBS | HEIGHT: 67 IN

## 2025-03-31 DIAGNOSIS — D47.3 ESSENTIAL THROMBOCYTHEMIA: Primary | ICD-10-CM

## 2025-03-31 LAB
ALBUMIN SERPL-MCNC: 4.1 G/DL (ref 3.5–5.2)
ALBUMIN/GLOB SERPL: 1.3 G/DL
ALP SERPL-CCNC: 103 U/L (ref 39–117)
ALT SERPL W P-5'-P-CCNC: 13 U/L (ref 1–33)
ANION GAP SERPL CALCULATED.3IONS-SCNC: 10.2 MMOL/L (ref 5–15)
AST SERPL-CCNC: 20 U/L (ref 1–32)
BASOPHILS # BLD AUTO: 0.03 10*3/MM3 (ref 0–0.2)
BASOPHILS NFR BLD AUTO: 0.3 % (ref 0–1.5)
BILIRUB SERPL-MCNC: 0.3 MG/DL (ref 0–1.2)
BUN SERPL-MCNC: 28 MG/DL (ref 8–23)
BUN/CREAT SERPL: 33.7 (ref 7–25)
CALCIUM SPEC-SCNC: 9.9 MG/DL (ref 8.6–10.5)
CHLORIDE SERPL-SCNC: 103 MMOL/L (ref 98–107)
CO2 SERPL-SCNC: 27.8 MMOL/L (ref 22–29)
CREAT SERPL-MCNC: 0.83 MG/DL (ref 0.57–1)
D DIMER PPP FEU-MCNC: 0.34 MCGFEU/ML (ref 0–0.73)
DEPRECATED RDW RBC AUTO: 47.2 FL (ref 37–54)
EGFRCR SERPLBLD CKD-EPI 2021: 74.5 ML/MIN/1.73
EOSINOPHIL # BLD AUTO: 0.1 10*3/MM3 (ref 0–0.4)
EOSINOPHIL NFR BLD AUTO: 1 % (ref 0.3–6.2)
ERYTHROCYTE [DISTWIDTH] IN BLOOD BY AUTOMATED COUNT: 14.9 % (ref 12.3–15.4)
FERRITIN SERPL-MCNC: 73.8 NG/ML (ref 13–150)
FOLATE SERPL-MCNC: 7.41 NG/ML (ref 4.78–24.2)
GLOBULIN UR ELPH-MCNC: 3.2 GM/DL
GLUCOSE SERPL-MCNC: 101 MG/DL (ref 65–99)
HCT VFR BLD AUTO: 42.9 % (ref 34–46.6)
HGB BLD-MCNC: 12.8 G/DL (ref 12–15.9)
IRON 24H UR-MRATE: 45 MCG/DL (ref 37–145)
IRON SATN MFR SERPL: 13 % (ref 20–50)
LYMPHOCYTES # BLD AUTO: 1.47 10*3/MM3 (ref 0.7–3.1)
LYMPHOCYTES NFR BLD AUTO: 15.4 % (ref 19.6–45.3)
MCH RBC QN AUTO: 26.2 PG (ref 26.6–33)
MCHC RBC AUTO-ENTMCNC: 29.8 G/DL (ref 31.5–35.7)
MCV RBC AUTO: 87.9 FL (ref 79–97)
MONOCYTES # BLD AUTO: 0.7 10*3/MM3 (ref 0.1–0.9)
MONOCYTES NFR BLD AUTO: 7.3 % (ref 5–12)
NEUTROPHILS NFR BLD AUTO: 7.25 10*3/MM3 (ref 1.7–7)
NEUTROPHILS NFR BLD AUTO: 76 % (ref 42.7–76)
PLATELET # BLD AUTO: 714 10*3/MM3 (ref 140–450)
PMV BLD AUTO: 10.4 FL (ref 6–12)
POTASSIUM SERPL-SCNC: 3.9 MMOL/L (ref 3.5–5.2)
PROT SERPL-MCNC: 7.3 G/DL (ref 6–8.5)
RBC # BLD AUTO: 4.88 10*6/MM3 (ref 3.77–5.28)
RETICS # AUTO: 0.07 10*6/MM3 (ref 0.02–0.13)
RETICS/RBC NFR AUTO: 1.55 % (ref 0.7–1.9)
SODIUM SERPL-SCNC: 141 MMOL/L (ref 136–145)
TIBC SERPL-MCNC: 343 MCG/DL (ref 298–536)
TRANSFERRIN SERPL-MCNC: 230 MG/DL (ref 200–360)
VIT B12 BLD-MCNC: 409 PG/ML (ref 211–946)
WBC NRBC COR # BLD AUTO: 9.55 10*3/MM3 (ref 3.4–10.8)

## 2025-03-31 PROCEDURE — 82746 ASSAY OF FOLIC ACID SERUM: CPT | Performed by: INTERNAL MEDICINE

## 2025-03-31 PROCEDURE — 36415 COLL VENOUS BLD VENIPUNCTURE: CPT | Performed by: INTERNAL MEDICINE

## 2025-03-31 PROCEDURE — 3078F DIAST BP <80 MM HG: CPT | Performed by: INTERNAL MEDICINE

## 2025-03-31 PROCEDURE — 3077F SYST BP >= 140 MM HG: CPT | Performed by: INTERNAL MEDICINE

## 2025-03-31 PROCEDURE — 83540 ASSAY OF IRON: CPT | Performed by: INTERNAL MEDICINE

## 2025-03-31 PROCEDURE — 82728 ASSAY OF FERRITIN: CPT | Performed by: INTERNAL MEDICINE

## 2025-03-31 PROCEDURE — 82607 VITAMIN B-12: CPT | Performed by: INTERNAL MEDICINE

## 2025-03-31 PROCEDURE — 84466 ASSAY OF TRANSFERRIN: CPT | Performed by: INTERNAL MEDICINE

## 2025-03-31 PROCEDURE — 85025 COMPLETE CBC W/AUTO DIFF WBC: CPT | Performed by: INTERNAL MEDICINE

## 2025-03-31 PROCEDURE — 80053 COMPREHEN METABOLIC PANEL: CPT | Performed by: INTERNAL MEDICINE

## 2025-03-31 PROCEDURE — 1125F AMNT PAIN NOTED PAIN PRSNT: CPT | Performed by: INTERNAL MEDICINE

## 2025-03-31 PROCEDURE — 85379 FIBRIN DEGRADATION QUANT: CPT | Performed by: INTERNAL MEDICINE

## 2025-03-31 PROCEDURE — 99204 OFFICE O/P NEW MOD 45 MIN: CPT | Performed by: INTERNAL MEDICINE

## 2025-03-31 PROCEDURE — 85045 AUTOMATED RETICULOCYTE COUNT: CPT | Performed by: INTERNAL MEDICINE

## 2025-03-31 RX ORDER — ANAGRELIDE 0.5 MG/1
1 CAPSULE ORAL 2 TIMES DAILY
Qty: 60 CAPSULE | Refills: 6 | Status: SHIPPED | OUTPATIENT
Start: 2025-03-31

## 2025-03-31 NOTE — PATIENT INSTRUCTIONS
Anegrilide-ordered for you 1 mg BID, continue ASA    -labs today    RTC in -mid to late May (can be after Memorial day) with lab recheck to ensure labs coming down.

## 2025-04-01 ENCOUNTER — PATIENT ROUNDING (BHMG ONLY) (OUTPATIENT)
Dept: ONCOLOGY | Facility: CLINIC | Age: 75
End: 2025-04-01
Payer: MEDICARE

## 2025-04-01 LAB — LEAD BLDV-MCNC: 1.2 UG/DL (ref 0–3.4)

## 2025-04-01 NOTE — PROGRESS NOTES
April 1, 2025    Hello, may I speak with Ileana Magdaleno?    My name is Karime Prasad      I am  with [facility]  [address] [facility] [place] [address] [phone].    Before we get started may I verify your date of birth? 1950    I am calling to officially welcome you to our practice and ask about your recent visit. Is this a good time to talk? no    Tell me about your visit with us. What things went well?  A My Chart message was sent to the patient.         We're always looking for ways to make our patients' experiences even better. Do you have recommendations on ways we may improve?  no    Overall were you satisfied with your first visit to our practice? yes       I appreciate you taking the time to speak with me today. Is there anything else I can do for you? no      Thank you, and have a great day.

## 2025-04-02 ENCOUNTER — TELEPHONE (OUTPATIENT)
Dept: ONCOLOGY | Facility: CLINIC | Age: 75
End: 2025-04-02
Payer: MEDICARE

## 2025-04-02 NOTE — TELEPHONE ENCOUNTER
Called pt and let her know her labs show no clear iron, b12, or folate deficiency; her labs look good/as expected. Pt to keep scheduled follow up in May.  Pt v/u.

## 2025-05-08 NOTE — PROGRESS NOTES
HEMATOLOGY ONCOLOGY OUTPATIENT FOLLOW UP       Patient name: Ileana Magdaleno  : 1950  MRN: [id number]  Primary Care Physician: Faby Keene APRN  Referring Physician: No ref. provider found  Reason For Consult: thrombocytosis-myeloproliferative disorder (essential thrombocythemia)    History of Present Illness:  Patient is a 74 y.o. female with past medical history of primary hypertension, dysuria, obesity (BMI 30-39.9), pre-diabetes, anxiety with depression, vitamin D deficiency, lead exposure, urinary frequency with a history of myeloproliferative disorder (essential thrombocythemia) presenting with thrombocytosis.    Hematology History  -12/15/2021 presented as a 71 y/o female for initial evaluation of thrombocytosis at [facility]. During her recent visit with Dr. Desir, labs were done which showed platelet count 1.7 million.  She denies any history of thrombosis, abdominal pain or early satiety, fever, chills, nausea or vomiting, weight loss, or bleeding problems. Patient reported left foot pain as she injured her left foot and was supposed to go for x-ray, and not able to bear weight on this foot and using wheelchair.    -2021 WBC 11.13 (neutrophils 64%, CMP is unremarkable lymphocyte 26%, monocytes 7%), Hgb 13.8, MCV 87.7, PLT 1783K,      -12/15/2021 WBC 11.57 (neutrophils 77%, lymphocytes 12%, monocytes 8%), Hgb 13.3, PLT 1617K, iron 32, iron saturation 16%, TIBC 204, ferritin 223.     -2021 WBC 11.04, Hgb 13.4, Plt 1717K      -2021 Bone marrow biopsy (at [facility]): showed chronic myeloid neoplasm involving a hypercellular marrow (50%) and no increase in blasts (1%). Megakaryocytic hyperplasia and atypia noted. Relative erythroid hypoplasia. No evidence of acute leukemia lymphoma or plasma cell neoplasm. Mild increased iron stores and no ring sideroblasts. No reticulin or collagenous fibrosis. The BCR/ABL1 translocation is not detected.   Pathologist comment:   The  "overall findings are consistent with involvement by a chronic myeloid neoplasm in which a myeloproliferative neoplasm (MPN) such as ET or PV is   favored. BCR-ABL1 FISH studies are negative (see [id number]), and the overall morphologic findings are not consistent with an atypical CML. -Chromosome analysis reveals normal karotype.  -No pathogenic single nucleotide variants, indels, or copy number changes found in: JAK2, CALR, MPL, CSF3R, TMB 2.1 m/MB.      -1/14/22: WBC 6.41, Hgb 12.8, Plt 1205 (prev PLT 1717), CMP essentially wnl  -1/14/2022 Venous Doppler LLE: from showed no evidence of DVT.  -2/10/22: WBC 6.88, Hgb 12.8, Plt 854 (prev PLT 1205), CMP essentially wnl     -3/24/2022 WBC 5.74, Hgb 11.9,   -5/26/2022 WBC 8.71, Hgb 12.3,   -9/8/2022 WBC 7.75, Hgb 12.5, ,   -10/6/2022 WBC 5.82, Hgb 12.1, ,   -12/1/2022 WBC 4.10, Hgb 12.2, ,   -5/5/2023 WBC 3.75 (ANC 2440), Hgb 11.9, HCT 37.2, , CMP essentially wnl  -8/4/2023 WBC 8.19, Hgb 12.7, .2, , CMP unremarkable except BUN 27  -11/8/2023 WBC 8.24, Hgb 12.3, , .     -1/20/2024 WBC 4.58 (Neutrophils 66%, lymphocyte 27%, monocyte 5%), Hgb 10.5, , . CMP is unremarkable.  -5/2/2024 WBC 5.85, Hgb 12.4, Plt 731     Hydrea was stopped on 5/2/2024 due to intolerance, anegrelide started.     -6/27/2024 last documented follow up Dr Puga: Hydrea was stopped at previous visit due to intolerable side effects of pt stating she had a \"terrible feeling in her head\" on Hydrea. She had been started on Anagrelide TID and feeling much better since stopping Hydrea and feels like her normal self. She is having some knee joint pain but otherwise doing ok with no fever chills, CP, SOA. Her energy is good and weight is stable. She is taking ASA 81 mg bid and reports good tolerance and compliance to this medication. A/P: Dr Puga: Continue Anagrelide (increase dose to 1 mg po bid " "(patient reports she has plenty of 0.5 mg anagrelide tablets.  She will be taking 2 tablets twice daily).  -continue EC aspirin 81 mg po bid    -2/14/2025 follow up with PCP for annual wellness exam, per her notes \"Thrombocytosis- she is not taken the agrylin. She reports she stopped seeing hematology because she wasn't feel well and her problems weren't addressed\" She also has noted pt has been declining vaccines (flu, shingles), DEXA, with colonoscopy, dental, eye exam all due.  -2/14/2025: WBC 8.59 (neutrophils 68%, lymphocytes 22.8%, monocytes 7.1%, eosinophil 1.3%, basophil 0.5%, immature granulocytes 0.3%), hgb 12.8/41.7, plts 956K, CMP WNL except for BUN 24/Cr 0.87.    -3/31/2025 Patient presented for initial consultation as a transfer of care with her \"favorite\" daughter (per daughter), she denies any history of MI/CVA/TIA or blood clots.  Reports as a new patient as her primary care noticed that her platelets have been uptrending to the 900,000's, and she was no longer following with her primary oncologist.  She reports that he was \"all over the place\".  She denies any fevers, chills, drenching night sweats, unintentional weight loss, bruising or bleeding.  Taking aspirin (enteric-coated).      Subjective:  -3/31/2025 WBC 9.55, hgb 12.8/hct 42.9, plts 714K, CMP essentially WNL, retics 1.55%, ddimer 0.34, iron 45 (N), iron sat 13% (L), ferritin 73 (N), vit B12 409, foalte 7.41 (N)    -5/21/2025 CBC showing platelets 666, hemoglobin within normal limits, CMP pending at time of visit    -5/21/2025 Patient presents for follow up- had been on anagrelide 2 pills BID for at least a few weeks before she started being not being as consistent 2/2 life being busy (took some days last week but not every day). Having issues with her left knee-\"knot\"; has been walking and rubbing the spot, AM stiffness. Doesn't drink a lot of water, either  -she still denies any history of MI/CVA/TIA or blood clots.  She reports her mind " has been everywhere due to life.  She denies any fevers, chills, drenching night sweats, unintentional weight loss, bruising or bleeding.  Still reports taking aspirin (enteric-coated).    Past Medical History:   Diagnosis Date    Anxiety        History reviewed. No pertinent surgical history.      Current Outpatient Medications:     anagrelide (AGRYLIN) 0.5 MG capsule, Take 2 capsules by mouth 2 (Two) Times a Day., Disp: 60 capsule, Rfl: 6    aspirin 81 MG EC tablet, Take 1 tablet by mouth 2 (Two) Times a Day., Disp: , Rfl:     cetirizine (zyrTEC) 10 MG tablet, Take 1 tablet by mouth Daily. (Patient taking differently: Take 1 tablet by mouth As Needed.), Disp: 90 tablet, Rfl: 3    lisinopril (PRINIVIL,ZESTRIL) 40 MG tablet, Take 1 tablet by mouth Daily., Disp: 90 tablet, Rfl: 1    albuterol sulfate  (90 Base) MCG/ACT inhaler, Inhale 2 puffs Every 6 (Six) Hours As Needed for Shortness of Air. (Patient not taking: Reported on 5/21/2025), Disp: 18 g, Rfl: 0    cefdinir (OMNICEF) 300 MG capsule, Take 1 capsule by mouth 2 (Two) Times a Day. (Patient not taking: Reported on 5/21/2025), Disp: 14 capsule, Rfl: 0    No Known Allergies    Family History   Problem Relation Age of Onset    Heart disease Father        Cancer-related family history is not on file.    Social History     Tobacco Use    Smoking status: Never    Smokeless tobacco: Never   Vaping Use    Vaping status: Never Used   Substance Use Topics    Alcohol use: Never    Drug use: Never     Social History     Social History Narrative    Not on file      Review of Systems:  Constitutional: +fatigue-stable (waxes and wanes), Denies any weakness, lack of appetite, excessive appetite, weight change, chills or fever.  Eyes: Reports no blurry vision, no double vision, no pain in the eyes, dry eyes or tearing.  ENT: Reports no decreased hearing capacity, ear pain or tinnitus. Denies any epistaxis, nasal congestion, mouth sores or bleeding, tooth or jaw pain,  "sore throat or hoarseness.  Respiratory: Denies any cough, sputum production or hemoptysis. There is no wheezing, shortness of breath or any other respiratory complaints.  Cardiovascular: Denies any chest pain, shortness of breath when lying down, shortness of breath with activity, palpitations, or leg swelling.  Breasts: Denies any lumps, bumps, pain, nipple changes or discharge in the breasts.  Gastrointestinal: There are no complaints of abdominal pain, difficulty swallowing or painful swallowing, anorexia, nausea, vomiting, diarrhea, constipation, heartburn, blood in the stools, dark stools, or change in bowel habits or hemorrhoids.  Genitourinary: Denies any pain or burning on urination, blood in the urine or frequent urination.   Musculoskeletal: +left knee pain/stiffness with knot on medial side. Denies painful joints, no swelling of the joints, muscle or back pain. Denies any pain or tingling in the legs, hands or feet.  Neuropsychiatric: Denies any nervousness, depressed mood, headaches, blackouts, dizziness, weakness of limbs, loss of sensation, loss of balance, loss of coordination or difficulty in speaking.  Cutaneous: +itching pt reports no itching-hasn't had itching), Denies any dry skin,  rash, change in the color of the skin, or any other cutaneous complaints.  Hematologic/Lymphatic: Denies any progressive bruising or bleeding. Report no recent development of palpable or painful lymph nodes.  Allergy/Immunology: Denies rash/hayfever symptoms or any recent history of pneumonia, recurrent sinusitis, urinary infection or any other history of an ongoing infection.  Endocrine: Reports no intolerance to heat or cold, excessive thirst or excessive urination.    Objective:  Vital signs:  Vitals:    05/21/25 1428   BP: 173/97   Pulse: 79   Temp: 97.8 °F (36.6 °C)   TempSrc: Oral   SpO2: 97%   Weight: 111 kg (244 lb 6.4 oz)   Height: 170.2 cm (67.01\")   PainSc: 0-No pain     Body mass index is 38.27 " kg/m².      Physical Exam:   ECO  GENERAL: The patient is an elderly overweight female who appears their stated age and is awake, in no acute distress.  SKIN: No rash or ulcers.   HEME/LYMPHATICS: No bruising or petechiae on visual inspection. No lymphadenopathy on visual inspection and palpation of cervical lymph nodes.  HEAD/FACE: atraumatic and normocephalic. Normal hair.  EYES: PERRLA/EOMI. No scleral icterus. No tearing or dry eyes.  ENT: External ears normal with no evidence of discharge. No evidence of ulceration or bleeding in the nostrils. Mouth has no ulcers, bleeding or inflammation of the gums, floor or roof of the mouth with normal dentition for age.  NECK: Supple, symmetric.   CHEST/RESPIRATORY: Expansion maintained bilaterally and symmetrically. On auscultation, clear breath sounds in both lungs. No wheezes, rhonchi or rales.  BREAST: Deferred.  CARDIOVASCULAR: On auscultation, regular rate and rhythm. No murmurs, gallops or rubs are heard.  GASTROINTESTINAL/ABDOMEN: Symmetric. On auscultation of the abdomen, there are normal bowel sounds. Non-tender to palpation.  GENITOURINARY: Deferred.  NEUROLOGIC: Alert and oriented time, person, and place, with no apparent changes in recent or remote memory. Cranial nerves II-XII are grossly intact. Sensation is maintained in the extremities. Strength and tone appear normal for age and equal in the extremities. Gait is normal.  MUSCULOSKELETAL: +1 cm firm NTTP nodule around the area of the pes anserine bursa No cyanosis, clubbing or edema in the extremities.   PSYCHIATRIC: The patient maintains judgment and has good insight. The patient has no changes in mood or affection..    Lab Results - Last 18 Months   Lab Units 25  1440 25  1517 25  1203   WBC 10*3/mm3 6.83 9.55 8.59   HEMOGLOBIN g/dL 13.2 12.8 12.8   HEMATOCRIT % 44.6 42.9 41.7   PLATELETS 10*3/mm3 666* 714* 956*   MCV fL 88.0 87.9 82.7     Lab Results - Last 18 Months   Lab Units  "05/21/25  1440 03/31/25  1517 02/14/25  1203   SODIUM mmol/L 143 141 140   POTASSIUM mmol/L 4.4 3.9 4.4   CHLORIDE mmol/L 106 103 103   CO2 mmol/L 28.5 27.8 26.7   BUN mg/dL 23 28* 24*   CREATININE mg/dL 0.81 0.83 0.87   CALCIUM mg/dL 9.9 9.9 10.3   BILIRUBIN mg/dL 0.3 0.3 0.5   ALK PHOS U/L 103 103 97   ALT (SGPT) U/L 10 13 12   AST (SGOT) U/L 17 20 21   GLUCOSE mg/dL 92 101* 92       Lab Results   Component Value Date    GLUCOSE 92 05/21/2025    BUN 23 05/21/2025    CREATININE 0.81 05/21/2025    EGFRIFAFRI >60 10/06/2022    BCR 28.4 (H) 05/21/2025    K 4.4 05/21/2025    CO2 28.5 05/21/2025    CALCIUM 9.9 05/21/2025    ALBUMIN 4.0 05/21/2025    LABIL2 1.0 (L) 10/06/2022    AST 17 05/21/2025    ALT 10 05/21/2025       Lab Results - Last 18 Months   Lab Units 03/31/24  1422   INR  1.00   APTT seconds 29.8*       Lab Results   Component Value Date    IRON 45 03/31/2025    TIBC 343 03/31/2025    FERRITIN 73.80 03/31/2025       Lab Results   Component Value Date    FOLATE 7.41 03/31/2025       No results found for: \"OCCULTBLD\"    Lab Results   Component Value Date    RETICCTPCT 1.55 03/31/2025     Lab Results   Component Value Date    YDOGGAEM88 409 03/31/2025     No results found for: \"SPEP\", \"UPEP\"  No results found for: \"LDH\", \"URICACID\"  No results found for: \"TRACEY\", \"RF\", \"SEDRATE\"  No results found for: \"FIBRINOGEN\", \"HAPTOGLOBIN\"  Lab Results   Component Value Date    PTT 29.8 (L) 03/31/2024    INR 1.00 03/31/2024     No results found for: \"\"  No results found for: \"CEA\"  No components found for: \"CA-19-9\"  No results found for: \"PSA\"  No results found for: \"SEDRATE\"    Assessment & Plan   74 y.o. female with past medical history of primary hypertension, dysuria, obesity (BMI 30-39.9), pre-diabetes, anxiety with depression, vitamin D deficiency, lead exposure, urinary frequency with a history of myeloproliferative disorder (essential thrombocythemia) presenting with thrombocytosis consistent with her " "ET.    1. Myeloproliferative neoplasm/triple negative essential thrombocythemia. Negative for JAK2, CALR, MPL, CSF3R and BCR/ABL1.     -12/22/2021 Bone marrow biopsy (at [facility]): showed chronic myeloid neoplasm involving a hypercellular marrow (50%) and no increase in blasts (1%). No evidence of acute leukemia lymphoma or plasma cell neoplasm. Mild increased iron stores and no ring sideroblasts. No reticulin or collagenous fibrosis. The BCR/ABL1 translocation not detected. Normal karotype. No pathogenic single nucleotide variants, indels, or copy number changes in: JAK2, CALR, MPL, CSF3R, TMB 2.1 m/MB.     -2/14/2025 follow up with PCP for annual wellness exam, per her notes \"Thrombocytosis- she is not taken the agrylin. She reports she stopped seeing hematology because she wasn't feel well and her problems weren't addressed\" She also has noted pt has been declining vaccines (flu, shingles), DEXA, with colonoscopy, dental, eye exam all due.   -2/14/2025 [facility]  -2/14/2025: WBC 8.59 (neutrophils 68%, lymphocytes 22.8%, monocytes 7.1%, eosinophil 1.3%, basophil 0.5%, immature granulocytes 0.3%), hgb 12.8/41.7, plts 956K, CMP WNL except for BUN 24/Cr 0.87.    -Hydrea was stopped on 5/2/2024 due to intolerance (headaches).  -continue (reordered 3/31/2025) Anagrelide 1 mg po bid (5/21-reordered again today discussing importance of compliance to her medications)    -continue EC aspirin 81 mg po bid    RTC: In approximately 3 months with labs (CBC, CMP) done preferentially a few days prior to ensure, Anegrelide dose is the correct dose      2. Hx Macrocytic anemia, resolved with treatment;  -2/14/2025 follow up with PCP for annual wellness exam, per her notes \"Thrombocytosis- she is not taken the agrylin. She reports she stopped seeing hematology because she wasn't feel well and her problems weren't addressed\" She also has noted pt has been declining vaccines (flu, shingles), DEXA, with " colonoscopy, dental, eye exam all due.  -2/14/2025: WBC 8.59 (neutrophils 68%, lymphocytes 22.8%, monocytes 7.1%, eosinophil 1.3%, basophil 0.5%, immature granulocytes 0.3%), hgb 12.8/41.7, plts 956K, CMP WNL except for BUN 24/Cr 0.87.  -3/2025 Reticulocytes, folate, ferritin, vitamin B12 within normal limits, D-dimer within normal limits, iron 45 (stable) with iron saturation 13% (slightly low)    -on RTC with CBC with repeat iron profile and ferritin ordered          Thank you very much for providing the opportunity to participate in this patient’s care. Please do not hesitate to call if there are any other questions.

## 2025-05-21 ENCOUNTER — LAB (OUTPATIENT)
Dept: LAB | Facility: HOSPITAL | Age: 75
End: 2025-05-21
Payer: MEDICARE

## 2025-05-21 ENCOUNTER — OFFICE VISIT (OUTPATIENT)
Dept: ONCOLOGY | Facility: CLINIC | Age: 75
End: 2025-05-21
Payer: MEDICARE

## 2025-05-21 VITALS
HEART RATE: 79 BPM | WEIGHT: 244.4 LBS | BODY MASS INDEX: 38.36 KG/M2 | HEIGHT: 67 IN | TEMPERATURE: 97.8 F | SYSTOLIC BLOOD PRESSURE: 173 MMHG | OXYGEN SATURATION: 97 % | DIASTOLIC BLOOD PRESSURE: 97 MMHG

## 2025-05-21 DIAGNOSIS — D47.3 ESSENTIAL THROMBOCYTHEMIA: ICD-10-CM

## 2025-05-21 DIAGNOSIS — D47.3 ESSENTIAL THROMBOCYTHEMIA: Primary | ICD-10-CM

## 2025-05-21 LAB
ALBUMIN SERPL-MCNC: 4 G/DL (ref 3.5–5.2)
ALBUMIN/GLOB SERPL: 1.2 G/DL
ALP SERPL-CCNC: 103 U/L (ref 39–117)
ALT SERPL W P-5'-P-CCNC: 10 U/L (ref 1–33)
ANION GAP SERPL CALCULATED.3IONS-SCNC: 8.5 MMOL/L (ref 5–15)
AST SERPL-CCNC: 17 U/L (ref 1–32)
BASOPHILS # BLD AUTO: 0.03 10*3/MM3 (ref 0–0.2)
BASOPHILS NFR BLD AUTO: 0.4 % (ref 0–1.5)
BILIRUB SERPL-MCNC: 0.3 MG/DL (ref 0–1.2)
BUN SERPL-MCNC: 23 MG/DL (ref 8–23)
BUN/CREAT SERPL: 28.4 (ref 7–25)
CALCIUM SPEC-SCNC: 9.9 MG/DL (ref 8.6–10.5)
CHLORIDE SERPL-SCNC: 106 MMOL/L (ref 98–107)
CO2 SERPL-SCNC: 28.5 MMOL/L (ref 22–29)
CREAT SERPL-MCNC: 0.81 MG/DL (ref 0.57–1)
D DIMER PPP FEU-MCNC: 0.39 MCGFEU/ML (ref 0–0.73)
DEPRECATED RDW RBC AUTO: 46.7 FL (ref 37–54)
EGFRCR SERPLBLD CKD-EPI 2021: 76.8 ML/MIN/1.73
EOSINOPHIL # BLD AUTO: 0.09 10*3/MM3 (ref 0–0.4)
EOSINOPHIL NFR BLD AUTO: 1.3 % (ref 0.3–6.2)
ERYTHROCYTE [DISTWIDTH] IN BLOOD BY AUTOMATED COUNT: 14.8 % (ref 12.3–15.4)
GLOBULIN UR ELPH-MCNC: 3.3 GM/DL
GLUCOSE SERPL-MCNC: 92 MG/DL (ref 65–99)
HCT VFR BLD AUTO: 44.6 % (ref 34–46.6)
HGB BLD-MCNC: 13.2 G/DL (ref 12–15.9)
LYMPHOCYTES # BLD AUTO: 1.63 10*3/MM3 (ref 0.7–3.1)
LYMPHOCYTES NFR BLD AUTO: 23.9 % (ref 19.6–45.3)
MCH RBC QN AUTO: 26 PG (ref 26.6–33)
MCHC RBC AUTO-ENTMCNC: 29.6 G/DL (ref 31.5–35.7)
MCV RBC AUTO: 88 FL (ref 79–97)
MONOCYTES # BLD AUTO: 0.75 10*3/MM3 (ref 0.1–0.9)
MONOCYTES NFR BLD AUTO: 11 % (ref 5–12)
NEUTROPHILS NFR BLD AUTO: 4.33 10*3/MM3 (ref 1.7–7)
NEUTROPHILS NFR BLD AUTO: 63.4 % (ref 42.7–76)
PLATELET # BLD AUTO: 666 10*3/MM3 (ref 140–450)
PMV BLD AUTO: 9.8 FL (ref 6–12)
POTASSIUM SERPL-SCNC: 4.4 MMOL/L (ref 3.5–5.2)
PROT SERPL-MCNC: 7.3 G/DL (ref 6–8.5)
RBC # BLD AUTO: 5.07 10*6/MM3 (ref 3.77–5.28)
SODIUM SERPL-SCNC: 143 MMOL/L (ref 136–145)
WBC NRBC COR # BLD AUTO: 6.83 10*3/MM3 (ref 3.4–10.8)

## 2025-05-21 PROCEDURE — 80053 COMPREHEN METABOLIC PANEL: CPT | Performed by: INTERNAL MEDICINE

## 2025-05-21 PROCEDURE — 85025 COMPLETE CBC W/AUTO DIFF WBC: CPT

## 2025-05-21 PROCEDURE — 85379 FIBRIN DEGRADATION QUANT: CPT | Performed by: INTERNAL MEDICINE

## 2025-05-21 PROCEDURE — 36415 COLL VENOUS BLD VENIPUNCTURE: CPT

## 2025-05-21 RX ORDER — ANAGRELIDE 0.5 MG/1
1 CAPSULE ORAL 2 TIMES DAILY
Qty: 60 CAPSULE | Refills: 6 | Status: SHIPPED | OUTPATIENT
Start: 2025-05-21

## 2025-05-21 NOTE — PATIENT INSTRUCTIONS
RTC in 3 months with labs a few days prior to MD if possible (so all the labs are back to discuss) , if not; can get same day (all the labs will not be back by then)    Refills of Agrulin (anagrelide) sent to ensure you have enough refills; continue 2 pills twice a day and daily aspirin

## 2025-08-27 ENCOUNTER — LAB (OUTPATIENT)
Dept: LAB | Facility: HOSPITAL | Age: 75
End: 2025-08-27
Payer: MEDICARE

## 2025-08-27 ENCOUNTER — OFFICE VISIT (OUTPATIENT)
Dept: ONCOLOGY | Facility: CLINIC | Age: 75
End: 2025-08-27
Payer: MEDICARE

## 2025-08-27 VITALS
RESPIRATION RATE: 20 BRPM | HEIGHT: 67 IN | WEIGHT: 244 LBS | TEMPERATURE: 97.3 F | SYSTOLIC BLOOD PRESSURE: 158 MMHG | OXYGEN SATURATION: 98 % | HEART RATE: 80 BPM | DIASTOLIC BLOOD PRESSURE: 76 MMHG | BODY MASS INDEX: 38.3 KG/M2

## 2025-08-27 DIAGNOSIS — D47.3 ESSENTIAL THROMBOCYTHEMIA: Primary | ICD-10-CM

## 2025-08-27 LAB
BASOPHILS # BLD AUTO: 0.08 10*3/MM3 (ref 0–0.2)
BASOPHILS NFR BLD AUTO: 0.8 % (ref 0–1.5)
DEPRECATED RDW RBC AUTO: 47.7 FL (ref 37–54)
EOSINOPHIL # BLD AUTO: 0.46 10*3/MM3 (ref 0–0.4)
EOSINOPHIL NFR BLD AUTO: 4.6 % (ref 0.3–6.2)
ERYTHROCYTE [DISTWIDTH] IN BLOOD BY AUTOMATED COUNT: 14.8 % (ref 12.3–15.4)
HCT VFR BLD AUTO: 42.5 % (ref 34–46.6)
HGB BLD-MCNC: 12.7 G/DL (ref 12–15.9)
HOLD SPECIMEN: NORMAL
HOLD SPECIMEN: NORMAL
IMM GRANULOCYTES # BLD AUTO: 0.03 10*3/MM3 (ref 0–0.05)
IMM GRANULOCYTES NFR BLD AUTO: 0.3 % (ref 0–0.5)
LYMPHOCYTES # BLD AUTO: 1.91 10*3/MM3 (ref 0.7–3.1)
LYMPHOCYTES NFR BLD AUTO: 19.2 % (ref 19.6–45.3)
MCH RBC QN AUTO: 25.9 PG (ref 26.6–33)
MCHC RBC AUTO-ENTMCNC: 29.9 G/DL (ref 31.5–35.7)
MCV RBC AUTO: 86.7 FL (ref 79–97)
MONOCYTES # BLD AUTO: 0.75 10*3/MM3 (ref 0.1–0.9)
MONOCYTES NFR BLD AUTO: 7.5 % (ref 5–12)
NEUTROPHILS NFR BLD AUTO: 6.73 10*3/MM3 (ref 1.7–7)
NEUTROPHILS NFR BLD AUTO: 67.6 % (ref 42.7–76)
PLATELET # BLD AUTO: 719 10*3/MM3 (ref 140–450)
PMV BLD AUTO: 9.5 FL (ref 6–12)
RBC # BLD AUTO: 4.9 10*6/MM3 (ref 3.77–5.28)
WBC NRBC COR # BLD AUTO: 9.96 10*3/MM3 (ref 3.4–10.8)

## 2025-08-27 PROCEDURE — 36415 COLL VENOUS BLD VENIPUNCTURE: CPT

## 2025-08-27 PROCEDURE — 85025 COMPLETE CBC W/AUTO DIFF WBC: CPT
